# Patient Record
Sex: MALE | Race: WHITE | NOT HISPANIC OR LATINO
[De-identification: names, ages, dates, MRNs, and addresses within clinical notes are randomized per-mention and may not be internally consistent; named-entity substitution may affect disease eponyms.]

---

## 2017-05-30 ENCOUNTER — APPOINTMENT (OUTPATIENT)
Dept: CARDIOTHORACIC SURGERY | Facility: CLINIC | Age: 57
End: 2017-05-30

## 2017-06-05 ENCOUNTER — APPOINTMENT (OUTPATIENT)
Dept: CARDIOTHORACIC SURGERY | Facility: CLINIC | Age: 57
End: 2017-06-05

## 2017-06-07 ENCOUNTER — APPOINTMENT (OUTPATIENT)
Dept: CV DIAGNOSITCS | Facility: HOSPITAL | Age: 57
End: 2017-06-07

## 2017-06-22 ENCOUNTER — APPOINTMENT (OUTPATIENT)
Dept: CARDIOTHORACIC SURGERY | Facility: CLINIC | Age: 57
End: 2017-06-22

## 2017-07-10 ENCOUNTER — APPOINTMENT (OUTPATIENT)
Dept: CARDIOTHORACIC SURGERY | Facility: CLINIC | Age: 57
End: 2017-07-10

## 2017-07-17 ENCOUNTER — APPOINTMENT (OUTPATIENT)
Dept: CARDIOTHORACIC SURGERY | Facility: CLINIC | Age: 57
End: 2017-07-17

## 2017-07-26 ENCOUNTER — APPOINTMENT (OUTPATIENT)
Dept: CV DIAGNOSITCS | Facility: HOSPITAL | Age: 57
End: 2017-07-26

## 2017-09-11 ENCOUNTER — APPOINTMENT (OUTPATIENT)
Dept: CARDIOTHORACIC SURGERY | Facility: CLINIC | Age: 57
End: 2017-09-11
Payer: MEDICAID

## 2017-09-13 ENCOUNTER — APPOINTMENT (OUTPATIENT)
Dept: CV DIAGNOSITCS | Facility: HOSPITAL | Age: 57
End: 2017-09-13

## 2017-09-18 ENCOUNTER — APPOINTMENT (OUTPATIENT)
Dept: CARDIOTHORACIC SURGERY | Facility: CLINIC | Age: 57
End: 2017-09-18
Payer: MEDICAID

## 2017-09-28 ENCOUNTER — TRANSCRIPTION ENCOUNTER (OUTPATIENT)
Age: 57
End: 2017-09-28

## 2017-10-12 ENCOUNTER — APPOINTMENT (OUTPATIENT)
Dept: CV DIAGNOSITCS | Facility: HOSPITAL | Age: 57
End: 2017-10-12
Payer: MEDICAID

## 2017-10-12 ENCOUNTER — OUTPATIENT (OUTPATIENT)
Dept: OUTPATIENT SERVICES | Facility: HOSPITAL | Age: 57
LOS: 1 days | End: 2017-10-12

## 2017-10-12 DIAGNOSIS — I35.0 NONRHEUMATIC AORTIC (VALVE) STENOSIS: ICD-10-CM

## 2017-10-12 PROCEDURE — 93306 TTE W/DOPPLER COMPLETE: CPT | Mod: 26

## 2017-10-12 PROCEDURE — 93312 ECHO TRANSESOPHAGEAL: CPT | Mod: 26

## 2017-10-12 PROCEDURE — 76376 3D RENDER W/INTRP POSTPROCES: CPT | Mod: 26

## 2017-10-23 ENCOUNTER — APPOINTMENT (OUTPATIENT)
Dept: CARDIOTHORACIC SURGERY | Facility: CLINIC | Age: 57
End: 2017-10-23
Payer: MEDICAID

## 2017-10-29 ENCOUNTER — FORM ENCOUNTER (OUTPATIENT)
Age: 57
End: 2017-10-29

## 2017-10-30 ENCOUNTER — OUTPATIENT (OUTPATIENT)
Dept: OUTPATIENT SERVICES | Facility: HOSPITAL | Age: 57
LOS: 1 days | End: 2017-10-30
Payer: COMMERCIAL

## 2017-10-30 ENCOUNTER — APPOINTMENT (OUTPATIENT)
Dept: CARDIOTHORACIC SURGERY | Facility: CLINIC | Age: 57
End: 2017-10-30
Payer: MEDICAID

## 2017-10-30 VITALS
HEART RATE: 73 BPM | HEIGHT: 72 IN | TEMPERATURE: 98.1 F | WEIGHT: 293 LBS | OXYGEN SATURATION: 96 % | RESPIRATION RATE: 18 BRPM | SYSTOLIC BLOOD PRESSURE: 151 MMHG | BODY MASS INDEX: 39.68 KG/M2 | DIASTOLIC BLOOD PRESSURE: 81 MMHG

## 2017-10-30 LAB
ALBUMIN SERPL ELPH-MCNC: 4.9 G/DL — SIGNIFICANT CHANGE UP (ref 3.3–5)
ALP SERPL-CCNC: 58 U/L — SIGNIFICANT CHANGE UP (ref 40–120)
ALT FLD-CCNC: 36 U/L — SIGNIFICANT CHANGE UP (ref 10–45)
ANION GAP SERPL CALC-SCNC: 15 MMOL/L — SIGNIFICANT CHANGE UP (ref 5–17)
APPEARANCE UR: CLEAR — SIGNIFICANT CHANGE UP
APTT BLD: 31 SEC — SIGNIFICANT CHANGE UP (ref 27.5–37.4)
AST SERPL-CCNC: 32 U/L — SIGNIFICANT CHANGE UP (ref 10–40)
BASOPHILS NFR BLD AUTO: 0.4 % — SIGNIFICANT CHANGE UP (ref 0–2)
BILIRUB SERPL-MCNC: 0.4 MG/DL — SIGNIFICANT CHANGE UP (ref 0.2–1.2)
BILIRUB UR-MCNC: NEGATIVE — SIGNIFICANT CHANGE UP
BUN SERPL-MCNC: 13 MG/DL — SIGNIFICANT CHANGE UP (ref 7–23)
CALCIUM SERPL-MCNC: 9.6 MG/DL — SIGNIFICANT CHANGE UP (ref 8.4–10.5)
CHLORIDE SERPL-SCNC: 98 MMOL/L — SIGNIFICANT CHANGE UP (ref 96–108)
CHOLEST SERPL-MCNC: 169 MG/DL — SIGNIFICANT CHANGE UP (ref 10–199)
CO2 SERPL-SCNC: 25 MMOL/L — SIGNIFICANT CHANGE UP (ref 22–31)
COLOR SPEC: YELLOW — SIGNIFICANT CHANGE UP
CREAT SERPL-MCNC: 0.93 MG/DL — SIGNIFICANT CHANGE UP (ref 0.5–1.3)
DIFF PNL FLD: NEGATIVE — SIGNIFICANT CHANGE UP
EOSINOPHIL NFR BLD AUTO: 1.5 % — SIGNIFICANT CHANGE UP (ref 0–6)
GLUCOSE SERPL-MCNC: 96 MG/DL — SIGNIFICANT CHANGE UP (ref 70–99)
GLUCOSE UR QL: NEGATIVE — SIGNIFICANT CHANGE UP
HBA1C BLD-MCNC: 4.8 % — SIGNIFICANT CHANGE UP (ref 4–5.6)
HBV SURFACE AG SER-ACNC: SIGNIFICANT CHANGE UP
HCT VFR BLD CALC: 39.5 % — SIGNIFICANT CHANGE UP (ref 39–50)
HDLC SERPL-MCNC: 51 MG/DL — SIGNIFICANT CHANGE UP (ref 40–125)
HGB BLD-MCNC: 14 G/DL — SIGNIFICANT CHANGE UP (ref 13–17)
INR BLD: 1.1 — SIGNIFICANT CHANGE UP (ref 0.88–1.16)
KETONES UR-MCNC: NEGATIVE — SIGNIFICANT CHANGE UP
LEUKOCYTE ESTERASE UR-ACNC: NEGATIVE — SIGNIFICANT CHANGE UP
LIPID PNL WITH DIRECT LDL SERPL: 98 MG/DL — SIGNIFICANT CHANGE UP
LYMPHOCYTES # BLD AUTO: 30.9 % — SIGNIFICANT CHANGE UP (ref 13–44)
MCHC RBC-ENTMCNC: 28.8 PG — SIGNIFICANT CHANGE UP (ref 27–34)
MCHC RBC-ENTMCNC: 35.4 G/DL — SIGNIFICANT CHANGE UP (ref 32–36)
MCV RBC AUTO: 81.3 FL — SIGNIFICANT CHANGE UP (ref 80–100)
MONOCYTES NFR BLD AUTO: 5 % — SIGNIFICANT CHANGE UP (ref 2–14)
NEUTROPHILS NFR BLD AUTO: 62.2 % — SIGNIFICANT CHANGE UP (ref 43–77)
NITRITE UR-MCNC: NEGATIVE — SIGNIFICANT CHANGE UP
PH UR: 7 — SIGNIFICANT CHANGE UP (ref 5–8)
PLATELET # BLD AUTO: 307 K/UL — SIGNIFICANT CHANGE UP (ref 150–400)
POTASSIUM SERPL-MCNC: 4.2 MMOL/L — SIGNIFICANT CHANGE UP (ref 3.5–5.3)
POTASSIUM SERPL-SCNC: 4.2 MMOL/L — SIGNIFICANT CHANGE UP (ref 3.5–5.3)
PROT SERPL-MCNC: 7.7 G/DL — SIGNIFICANT CHANGE UP (ref 6–8.3)
PROT UR-MCNC: NEGATIVE MG/DL — SIGNIFICANT CHANGE UP
PROTHROM AB SERPL-ACNC: 12.2 SEC — SIGNIFICANT CHANGE UP (ref 9.8–12.7)
RBC # BLD: 4.86 M/UL — SIGNIFICANT CHANGE UP (ref 4.2–5.8)
RBC # FLD: 11.9 % — SIGNIFICANT CHANGE UP (ref 10.3–16.9)
SODIUM SERPL-SCNC: 138 MMOL/L — SIGNIFICANT CHANGE UP (ref 135–145)
SP GR SPEC: <=1.005 — SIGNIFICANT CHANGE UP (ref 1–1.03)
TOTAL CHOLESTEROL/HDL RATIO MEASUREMENT: 3.3 RATIO — LOW (ref 3.4–9.6)
TRIGL SERPL-MCNC: 100 MG/DL — SIGNIFICANT CHANGE UP (ref 10–149)
TSH SERPL-MCNC: 1.34 UIU/ML — SIGNIFICANT CHANGE UP (ref 0.35–4.94)
UROBILINOGEN FLD QL: 0.2 E.U./DL — SIGNIFICANT CHANGE UP
WBC # BLD: 7.4 K/UL — SIGNIFICANT CHANGE UP (ref 3.8–10.5)
WBC # FLD AUTO: 7.4 K/UL — SIGNIFICANT CHANGE UP (ref 3.8–10.5)

## 2017-10-30 PROCEDURE — 99205 OFFICE O/P NEW HI 60 MIN: CPT

## 2017-10-30 PROCEDURE — 71250 CT THORAX DX C-: CPT | Mod: 26

## 2017-10-30 PROCEDURE — 71250 CT THORAX DX C-: CPT

## 2017-10-30 PROCEDURE — 86850 RBC ANTIBODY SCREEN: CPT

## 2017-10-30 PROCEDURE — 86901 BLOOD TYPING SEROLOGIC RH(D): CPT

## 2017-10-30 PROCEDURE — 83036 HEMOGLOBIN GLYCOSYLATED A1C: CPT

## 2017-10-30 PROCEDURE — 85610 PROTHROMBIN TIME: CPT

## 2017-10-30 PROCEDURE — 80061 LIPID PANEL: CPT

## 2017-10-30 PROCEDURE — 80053 COMPREHEN METABOLIC PANEL: CPT

## 2017-10-30 PROCEDURE — 81003 URINALYSIS AUTO W/O SCOPE: CPT

## 2017-10-30 PROCEDURE — 84443 ASSAY THYROID STIM HORMONE: CPT

## 2017-10-30 PROCEDURE — 85025 COMPLETE CBC W/AUTO DIFF WBC: CPT

## 2017-10-30 PROCEDURE — 86900 BLOOD TYPING SEROLOGIC ABO: CPT

## 2017-10-30 PROCEDURE — 85730 THROMBOPLASTIN TIME PARTIAL: CPT

## 2017-10-30 PROCEDURE — 87340 HEPATITIS B SURFACE AG IA: CPT

## 2017-10-30 RX ORDER — BENZONATATE 100 MG/1
100 CAPSULE ORAL
Refills: 0 | Status: COMPLETED | COMMUNITY
End: 2017-10-30

## 2017-10-30 RX ORDER — LORATADINE 10 MG/1
10 TABLET ORAL DAILY
Refills: 0 | Status: COMPLETED | COMMUNITY
End: 2017-10-30

## 2017-10-30 RX ORDER — PROMETHAZINE HYDROCHLORIDE AND DEXTROMETHORPHAN HYDROBROMIDE ORAL SOLUTION 15; 6.25 MG/5ML; MG/5ML
6.25-15 SOLUTION ORAL
Refills: 0 | Status: COMPLETED | COMMUNITY
End: 2017-10-30

## 2017-11-03 VITALS
TEMPERATURE: 98 F | SYSTOLIC BLOOD PRESSURE: 138 MMHG | DIASTOLIC BLOOD PRESSURE: 77 MMHG | OXYGEN SATURATION: 97 % | HEART RATE: 83 BPM | RESPIRATION RATE: 16 BRPM

## 2017-11-03 NOTE — H&P ADULT - ASSESSMENT
57yo male with PMHx significant for severe Aortic Stenosis (with MAYRA 0.6cm2) with bicuspid valve and childhood asthma who presents for cardiac catheterization to assess coronaries prior to scheduled AVR 11/7/17.     - no Loading or pre cath fluids as patient pre op for AVR.    Risks & benefits of procedure and alternative therapy have been explained to the patient including but not limited to: allergic reaction, bleeding w/possible need for blood transfusion, infection, renal and vascular compromise, limb damage, arrhythmia, stroke, vessel dissection/perforation, Myocardial infarction, emergent CABG. Informed consent obtained and in chart.

## 2017-11-03 NOTE — H&P ADULT - FAMILY HISTORY
Father  Still living? No  Family history of heart attack, Age at diagnosis: 51-60  Family history of esophageal cancer, Age at diagnosis: Age Unknown

## 2017-11-03 NOTE — H&P ADULT - HISTORY OF PRESENT ILLNESS
57yo male with PMHx significant for severe Aortic Stenosis (with MAYRA 0.6cm20 with bicuspid valve and childhood asthma presented for routine checkup for evaluation of bicuspid valve with aortic stenosis. Patient reported that he was found to have murmur on physical exam in 2012 and had an echocardiogram which found to the following: mild to moderate aortic insufficiency, mild aortic mean gradient 15mmHg, MAYRA 1.5cm2, normal LV function, no pulm HTN, enlarged LA size, mild MR, LVEF 65%.  Patient has surveillance echocardiogram since then and had one 2/24/2017 that showed EF 60%, mild LA enlargement, bicuspid valve with moderate to severe AS, mitral valve normal, tricuspid valve normal, pulmonic valve normal, PAS 28mmHg, mean gradient 40mmHg, peak 75mmHg, MAYRA 1.13cm2. Patient have been asymptomatic; very active engaging in cycling classes 5 times a week. Denies chest pain, dizziness, palpitations, syncope, fatigue, shortness of breath, LE edema, PND, or orthopnea.  Patient subsequently had TEE10/22/2017 that revealed a calcified bicuspid aortic valve with decreased opening, right and non-coronary cusps fused and a calcified midline raphe is visualized, peak transaortic valve gradient ~89mmHg, mean transaortic valve gradient, ~MAYRA 0.6cm2 c/w severe aortic stenosis, mild to moderate AI, mitral annular calcification, otherwise normal MV, mild MR, normal aortic root, aortic arch, and descending thoracic aorta, normal LA with no LA appendage thrombus, normal LV systolic with EF 66%, no segmental WMA.  Patient was referred to Dr. Hoffman for surgical consultation who recommended that patient may benefit from and is a candidate for aortic valve replacement with bioprosthesis.   Patient now presents for cardiac catheterization to assess coronaries prior to scheduled AVR. 55yo male with PMHx significant for severe Aortic Stenosis (with MAYRA 0.6cm2) with bicuspid valve and childhood asthma presented for routine checkup for evaluation of bicuspid valve with aortic stenosis. Patient reported that he was found to have murmur on physical exam in 2012 and had an echocardiogram which found the following: mild to moderate aortic insufficiency, mild aortic stenosis, mean gradient 15mmHg, MAYRA 1.5cm2, normal LV function, no pulm HTN, enlarged LA size, mild MR, LVEF 65%.  Patient has surveillance echocardiogram since then and had one 2/24/2017 that showed EF 60%, mild LA enlargement, bicuspid valve with moderate to severe AS, mitral valve normal, tricuspid valve normal, pulmonic valve normal, PAS 28mmHg, mean gradient 40mmHg, peak 75mmHg, MAYRA 1.13cm2. Patient have been asymptomatic; very active engaging in cycling classes 5 times a week. Denies chest pain, dizziness, palpitations, syncope, fatigue, shortness of breath, LE edema, PND, or orthopnea.  Patient subsequently had TEE10/22/2017 that revealed a calcified bicuspid aortic valve with decreased opening, right and non-coronary cusps fused and a calcified midline raphe is visualized, peak transaortic valve gradient ~89mmHg, mean transaortic valve gradient, ~MAYRA 0.6cm2 c/w severe aortic stenosis, mild to moderate AI, mitral annular calcification, otherwise normal MV, mild MR, normal aortic root, aortic arch, and descending thoracic aorta, normal LA with no LA appendage thrombus, normal LV systolic with EF 66%, no segmental WMA.  Patient was referred to Dr. Hoffman for surgical consultation who recommended that patient may benefit from and is a candidate for aortic valve replacement with bioprosthesis.   Patient now presents for cardiac catheterization to assess coronaries prior to scheduled AVR. 57yo male with PMHx significant for severe Aortic Stenosis (with MAYRA 0.6cm2) with bicuspid valve and childhood asthma who presented for routine checkup for evaluation of bicuspid valve with aortic stenosis.  Patient reported that he was found to have murmur on physical exam in 2012 and since has undergone Surveillance Echocardiograms.  Most recent Echocardiogram 2/24/2017 revealing EF 60%, mild LA enlargement, bicuspid valve with moderate to severe AS, mitral valve normal, tricuspid valve normal, pulmonic valve normal, PAS 28mmHg, mean gradient 40mmHg, peak 75mmHg, MAYRA 1.13cm2. Patient has been asymptomatic; very active engaging in cycling classes 5 times a week. Denies chest pain, dizziness, palpitations, syncope, fatigue, shortness of breath, LE edema, PND, or orthopnea.  Patient subsequently had TEE10/22/2017 that revealed a calcified bicuspid aortic valve with decreased opening, right and non-coronary cusps fused and a calcified midline raphe is visualized, peak transaortic valve gradient ~89mmHg, mean transaortic valve gradient, ~MAYRA 0.6cm2 c/w severe aortic stenosis, mild to moderate AI, mitral annular calcification, otherwise normal MV, mild MR, normal aortic root, aortic arch, and descending thoracic aorta, normal LA with no LA appendage thrombus, normal LV systolic with EF 66%, no segmental WMA.    Patient was referred to Dr. Hoffman for surgical consultation who recommended that patient would benefit from and is a candidate for aortic valve replacement with bioprosthesis.  He now presents for cardiac catheterization to assess coronaries prior to scheduled AVR 11/7/17.

## 2017-11-06 ENCOUNTER — INPATIENT (INPATIENT)
Facility: HOSPITAL | Age: 57
LOS: 4 days | Discharge: HOME HEALTH | DRG: 217 | End: 2017-11-11
Attending: THORACIC SURGERY (CARDIOTHORACIC VASCULAR SURGERY) | Admitting: THORACIC SURGERY (CARDIOTHORACIC VASCULAR SURGERY)
Payer: COMMERCIAL

## 2017-11-06 DIAGNOSIS — Z87.442 PERSONAL HISTORY OF URINARY CALCULI: Chronic | ICD-10-CM

## 2017-11-06 LAB
ALBUMIN SERPL ELPH-MCNC: 4.5 G/DL — SIGNIFICANT CHANGE UP (ref 3.3–5)
ALP SERPL-CCNC: 48 U/L — SIGNIFICANT CHANGE UP (ref 40–120)
ALT FLD-CCNC: 38 U/L — SIGNIFICANT CHANGE UP (ref 10–45)
ANION GAP SERPL CALC-SCNC: 13 MMOL/L — SIGNIFICANT CHANGE UP (ref 5–17)
APPEARANCE UR: CLEAR — SIGNIFICANT CHANGE UP
APTT BLD: 23.6 SEC — LOW (ref 27.5–37.4)
APTT BLD: >200 SEC — CRITICAL HIGH (ref 27.5–37.4)
AST SERPL-CCNC: 38 U/L — SIGNIFICANT CHANGE UP (ref 10–40)
BASOPHILS NFR BLD AUTO: 0.3 % — SIGNIFICANT CHANGE UP (ref 0–2)
BASOPHILS NFR BLD AUTO: 0.5 % — SIGNIFICANT CHANGE UP (ref 0–2)
BILIRUB SERPL-MCNC: 0.4 MG/DL — SIGNIFICANT CHANGE UP (ref 0.2–1.2)
BILIRUB UR-MCNC: NEGATIVE — SIGNIFICANT CHANGE UP
BLD GP AB SCN SERPL QL: NEGATIVE — SIGNIFICANT CHANGE UP
BUN SERPL-MCNC: 16 MG/DL — SIGNIFICANT CHANGE UP (ref 7–23)
CALCIUM SERPL-MCNC: 9.3 MG/DL — SIGNIFICANT CHANGE UP (ref 8.4–10.5)
CHLORIDE SERPL-SCNC: 104 MMOL/L — SIGNIFICANT CHANGE UP (ref 96–108)
CK MB CFR SERPL CALC: 2.9 NG/ML — SIGNIFICANT CHANGE UP (ref 0–6.7)
CO2 SERPL-SCNC: 23 MMOL/L — SIGNIFICANT CHANGE UP (ref 22–31)
COLOR SPEC: YELLOW — SIGNIFICANT CHANGE UP
CREAT SERPL-MCNC: 0.83 MG/DL — SIGNIFICANT CHANGE UP (ref 0.5–1.3)
DIFF PNL FLD: NEGATIVE — SIGNIFICANT CHANGE UP
EOSINOPHIL NFR BLD AUTO: 1.2 % — SIGNIFICANT CHANGE UP (ref 0–6)
EOSINOPHIL NFR BLD AUTO: 1.7 % — SIGNIFICANT CHANGE UP (ref 0–6)
GLUCOSE SERPL-MCNC: 105 MG/DL — HIGH (ref 70–99)
GLUCOSE UR QL: NEGATIVE — SIGNIFICANT CHANGE UP
HCT VFR BLD CALC: 36.3 % — LOW (ref 39–50)
HCT VFR BLD CALC: 38.3 % — LOW (ref 39–50)
HGB BLD-MCNC: 13.4 G/DL — SIGNIFICANT CHANGE UP (ref 13–17)
HGB BLD-MCNC: 13.7 G/DL — SIGNIFICANT CHANGE UP (ref 13–17)
INR BLD: 1.13 — SIGNIFICANT CHANGE UP (ref 0.88–1.16)
INR BLD: 1.21 — HIGH (ref 0.88–1.16)
KETONES UR-MCNC: NEGATIVE — SIGNIFICANT CHANGE UP
LEUKOCYTE ESTERASE UR-ACNC: NEGATIVE — SIGNIFICANT CHANGE UP
LYMPHOCYTES # BLD AUTO: 35.7 % — SIGNIFICANT CHANGE UP (ref 13–44)
LYMPHOCYTES # BLD AUTO: 38.5 % — SIGNIFICANT CHANGE UP (ref 13–44)
MCHC RBC-ENTMCNC: 28.8 PG — SIGNIFICANT CHANGE UP (ref 27–34)
MCHC RBC-ENTMCNC: 29.6 PG — SIGNIFICANT CHANGE UP (ref 27–34)
MCHC RBC-ENTMCNC: 35.8 G/DL — SIGNIFICANT CHANGE UP (ref 32–36)
MCHC RBC-ENTMCNC: 36.9 G/DL — HIGH (ref 32–36)
MCV RBC AUTO: 80.1 FL — SIGNIFICANT CHANGE UP (ref 80–100)
MCV RBC AUTO: 80.6 FL — SIGNIFICANT CHANGE UP (ref 80–100)
MONOCYTES NFR BLD AUTO: 4 % — SIGNIFICANT CHANGE UP (ref 2–14)
MONOCYTES NFR BLD AUTO: 7.3 % — SIGNIFICANT CHANGE UP (ref 2–14)
NEUTROPHILS NFR BLD AUTO: 55.3 % — SIGNIFICANT CHANGE UP (ref 43–77)
NEUTROPHILS NFR BLD AUTO: 55.5 % — SIGNIFICANT CHANGE UP (ref 43–77)
NITRITE UR-MCNC: NEGATIVE — SIGNIFICANT CHANGE UP
NT-PROBNP SERPL-SCNC: 133 PG/ML — SIGNIFICANT CHANGE UP (ref 0–300)
PH UR: 7 — SIGNIFICANT CHANGE UP (ref 5–8)
PLATELET # BLD AUTO: 250 K/UL — SIGNIFICANT CHANGE UP (ref 150–400)
PLATELET # BLD AUTO: 257 K/UL — SIGNIFICANT CHANGE UP (ref 150–400)
POTASSIUM SERPL-MCNC: 4.6 MMOL/L — SIGNIFICANT CHANGE UP (ref 3.5–5.3)
POTASSIUM SERPL-SCNC: 4.6 MMOL/L — SIGNIFICANT CHANGE UP (ref 3.5–5.3)
PROT SERPL-MCNC: 7.3 G/DL — SIGNIFICANT CHANGE UP (ref 6–8.3)
PROT UR-MCNC: NEGATIVE MG/DL — SIGNIFICANT CHANGE UP
PROTHROM AB SERPL-ACNC: 12.6 SEC — SIGNIFICANT CHANGE UP (ref 9.8–12.7)
PROTHROM AB SERPL-ACNC: 13.5 SEC — HIGH (ref 9.8–12.7)
RBC # BLD: 4.53 M/UL — SIGNIFICANT CHANGE UP (ref 4.2–5.8)
RBC # BLD: 4.75 M/UL — SIGNIFICANT CHANGE UP (ref 4.2–5.8)
RBC # FLD: 11.8 % — SIGNIFICANT CHANGE UP (ref 10.3–16.9)
RBC # FLD: 11.8 % — SIGNIFICANT CHANGE UP (ref 10.3–16.9)
RH IG SCN BLD-IMP: POSITIVE — SIGNIFICANT CHANGE UP
SODIUM SERPL-SCNC: 140 MMOL/L — SIGNIFICANT CHANGE UP (ref 135–145)
SP GR SPEC: <=1.005 — SIGNIFICANT CHANGE UP (ref 1–1.03)
TSH SERPL-MCNC: 1.06 UIU/ML — SIGNIFICANT CHANGE UP (ref 0.35–4.94)
UROBILINOGEN FLD QL: 0.2 E.U./DL — SIGNIFICANT CHANGE UP
WBC # BLD: 5.8 K/UL — SIGNIFICANT CHANGE UP (ref 3.8–10.5)
WBC # BLD: 7.5 K/UL — SIGNIFICANT CHANGE UP (ref 3.8–10.5)
WBC # FLD AUTO: 5.8 K/UL — SIGNIFICANT CHANGE UP (ref 3.8–10.5)
WBC # FLD AUTO: 7.5 K/UL — SIGNIFICANT CHANGE UP (ref 3.8–10.5)

## 2017-11-06 PROCEDURE — 76536 US EXAM OF HEAD AND NECK: CPT | Mod: 26

## 2017-11-06 PROCEDURE — 93010 ELECTROCARDIOGRAM REPORT: CPT

## 2017-11-06 PROCEDURE — 71010: CPT | Mod: 26

## 2017-11-06 PROCEDURE — 93880 EXTRACRANIAL BILAT STUDY: CPT | Mod: 26,59

## 2017-11-06 PROCEDURE — 93454 CORONARY ARTERY ANGIO S&I: CPT | Mod: 26

## 2017-11-06 RX ORDER — INFLUENZA VIRUS VACCINE 15; 15; 15; 15 UG/.5ML; UG/.5ML; UG/.5ML; UG/.5ML
0.5 SUSPENSION INTRAMUSCULAR ONCE
Qty: 0 | Refills: 0 | Status: DISCONTINUED | OUTPATIENT
Start: 2017-11-06 | End: 2017-11-09

## 2017-11-06 RX ORDER — CHLORHEXIDINE GLUCONATE 213 G/1000ML
1 SOLUTION TOPICAL ONCE
Qty: 0 | Refills: 0 | Status: DISCONTINUED | OUTPATIENT
Start: 2017-11-06 | End: 2017-11-06

## 2017-11-06 RX ORDER — ASPIRIN/CALCIUM CARB/MAGNESIUM 324 MG
81 TABLET ORAL DAILY
Qty: 0 | Refills: 0 | Status: DISCONTINUED | OUTPATIENT
Start: 2017-11-06 | End: 2017-11-08

## 2017-11-06 RX ORDER — FAMOTIDINE 10 MG/ML
20 INJECTION INTRAVENOUS
Qty: 0 | Refills: 0 | Status: DISCONTINUED | OUTPATIENT
Start: 2017-11-06 | End: 2017-11-08

## 2017-11-06 RX ORDER — SODIUM CHLORIDE 9 MG/ML
3 INJECTION INTRAMUSCULAR; INTRAVENOUS; SUBCUTANEOUS ONCE
Qty: 0 | Refills: 0 | Status: COMPLETED | OUTPATIENT
Start: 2017-11-06 | End: 2017-11-07

## 2017-11-06 RX ORDER — HEPARIN SODIUM 5000 [USP'U]/ML
5000 INJECTION INTRAVENOUS; SUBCUTANEOUS EVERY 8 HOURS
Qty: 0 | Refills: 0 | Status: DISCONTINUED | OUTPATIENT
Start: 2017-11-07 | End: 2017-11-08

## 2017-11-06 NOTE — PATIENT PROFILE ADULT. - NS PRO INDICAT FLU
Patient/surrogate requesting vaccine Patient is 18 years or older/Patient/surrogate requesting vaccine

## 2017-11-06 NOTE — PROGRESS NOTE ADULT - ASSESSMENT
57yo male with PMHx significant for severe Aortic Stenosis (with MAYRA 0.6cm2) with bicuspid valve and childhood asthma who presents for cardiac catheterization to assess coronaries prior to scheduled AVR 11/7/17. Cardiac cath revealed non- obstructive CAD. (Mild LM, MIDLAD 30% stenosis, Diagnosis 100% with collaterals),  RAMus 75% stenosis, Prox Circ 30%, MID RCA 30% EF 60%. Patient will have AVR on Wednesday.   - Pre-op Labs ordered.   - Follow up PFTs, carotid US, CXR   - Needs thyroid US for nodule seen on CT ches   - Dr. Trotter to see for Pulmonary nodules.   - Aspirin 81mg PO  - Ask about atorvistatin tomorrow as patient reports myalgias.   - BP soft in cath lab holding. Recheck tomorrow need for BB.   - Follow up morning labs and chest Xray.     CHARLES Silverio

## 2017-11-06 NOTE — PATIENT PROFILE ADULT. - VISION (WITH CORRECTIVE LENSES IF THE PATIENT USUALLY WEARS THEM):
"Discharge Instructions: After Your Surgery/Procedure  Youve just had surgery. During surgery you were given medicine called anesthesia to keep you relaxed and free of pain. After surgery you may have some pain or nausea. This is common. Here are some tips for feeling better and getting well after surgery.     Stay on schedule with your medication.   Going home  Your doctor or nurse will show you how to take care of yourself when you go home. He or she will also answer your questions. Have an adult family member or friend drive you home.      For your safety we recommend these precaution for the first 24 hours after your procedure:  · Do not drive or use heavy equipment.  · Do not make important decisions or sign legal papers.  · Do not drink alcohol.  · Have someone stay with you, if needed. He or she can watch for problems and help keep you safe.  · Your concentration, balance, coordination, and judgement may be impaired for many hours after anesthesia.  Use caution when ambulating or standing up.     · You may feel weak and "washed out" after anesthesia and surgery.      Subtle residual effects of general anesthesia or sedation with regional / local anesthesia can last more than 24 hours.  Rest for the remainder of the day or longer if your Doctor/Surgeon has advised you to do so.  Although you may feel normal within the first 24 hours, your reflexes and mental ability may be impaired without you realizing it.  You may feel dizzy, lightheaded or sleepy for 24 hours or longer.      Be sure to go to all follow-up visits with your doctor. And rest after your surgery for as long as your doctor tells you to.  Coping with pain  If you have pain after surgery, pain medicine will help you feel better. Take it as told, before pain becomes severe. Also, ask your doctor or pharmacist about other ways to control pain. This might be with heat, ice, or relaxation. And follow any other instructions your surgeon or nurse gives " Normal vision: sees adequately in most situations; can see medication labels, newsprint you.  Tips for taking pain medicine  To get the best relief possible, remember these points:  · Pain medicines can upset your stomach. Taking them with a little food may help.  · Most pain relievers taken by mouth need at least 20 to 30 minutes to start to work.  · Taking medicine on a schedule can help you remember to take it. Try to time your medicine so that you can take it before starting an activity. This might be before you get dressed, go for a walk, or sit down for dinner.  · Constipation is a common side effect of pain medicines. Call your doctor before taking any medicines such as laxatives or stool softeners to help ease constipation. Also ask if you should skip any foods. Drinking lots of fluids and eating foods such as fruits and vegetables that are high in fiber can also help. Remember, do not take laxatives unless your surgeon has prescribed them.  · Drinking alcohol and taking pain medicine can cause dizziness and slow your breathing. It can even be deadly. Do not drink alcohol while taking pain medicine.  · Pain medicine can make you react more slowly to things. Do not drive or run machinery while taking pain medicine.  Your health care provider may tell you to take acetaminophen to help ease your pain. Ask him or her how much you are supposed to take each day. Acetaminophen or other pain relievers may interact with your prescription medicines or other over-the-counter (OTC) drugs. Some prescription medicines have acetaminophen and other ingredients. Using both prescription and OTC acetaminophen for pain can cause you to overdose. Read the labels on your OTC medicines with care. This will help you to clearly know the list of ingredients, how much to take, and any warnings. It may also help you not take too much acetaminophen. If you have questions or do not understand the information, ask your pharmacist or health care provider to explain it to you before you take the OTC medicine.  Managing  nausea  Some people have an upset stomach after surgery. This is often because of anesthesia, pain, or pain medicine, or the stress of surgery. These tips will help you handle nausea and eat healthy foods as you get better. If you were on a special food plan before surgery, ask your doctor if you should follow it while you get better. These tips may help:  · Do not push yourself to eat. Your body will tell you when to eat and how much.  · Start off with clear liquids and soup. They are easier to digest.  · Next try semi-solid foods, such as mashed potatoes, applesauce, and gelatin, as you feel ready.  · Slowly move to solid foods. Dont eat fatty, rich, or spicy foods at first.  · Do not force yourself to have 3 large meals a day. Instead eat smaller amounts more often.  · Take pain medicines with a small amount of solid food, such as crackers or toast, to avoid nausea.     Call your surgeon if  · You still have pain an hour after taking medicine. The medicine may not be strong enough.  · You feel too sleepy, dizzy, or groggy. The medicine may be too strong.  · You have side effects like nausea, vomiting, or skin changes, such as rash, itching, or hives.       If you have obstructive sleep apnea  You were given anesthesia medicine during surgery to keep you comfortable and free of pain. After surgery, you may have more apnea spells because of this medicine and other medicines you were given. The spells may last longer than usual.   At home:  · Keep using the continuous positive airway pressure (CPAP) device when you sleep. Unless your health care provider tells you not to, use it when you sleep, day or night. CPAP is a common device used to treat obstructive sleep apnea.  · Talk with your provider before taking any pain medicine, muscle relaxants, or sedatives. Your provider will tell you about the possible dangers of taking these medicines.  © 8349-2690 [employer]. [address] [place] [address]  [place] [address]. All rights reserved. This information is not intended as a substitute for professional medical care. Always follow your healthcare professional's instructions.    General Information:    1.  Do not drink alcoholic beverages including beer for 24 hours or as long as you are on pain medication..  2.  Do not drive a motor vehicle, operate machinery or power tools, or signs legal papers for 24 hours or as long as you are on pain medication.   3.  You may experience light-headedness, dizziness, and sleepiness following surgery. Please do not stay alone. A responsible adult should be with you for this 24 hour period.  4.  Go home and rest.    5. Progress slowly to a normal diet unless instructed.  Otherwise, begin with liquids such as soft drinks, then soup and crackers working up to solid foods. Drink plenty of nonalcoholic fluids.  6.  Certain anesthetics and pain medications produce nausea and vomiting in certain       individuals. If nausea becomes a problem at home, call you doctor.    7. A nurse will be calling you sometime after surgery. Do not be alarmed. This is our way of finding out how you are doing.    8. Several times every hour while you are awake, take 2-3 deep breaths and cough. If you had stomach surgery hold a pillow or rolled towel firmly against your stomach before you cough. This will help with any pain the cough might cause.  9. Several times every hour while you are awake, pump and flex your feet 5-6 times and do foot circles. This will help prevent blood clots.    10.Call your doctor for severe pain, bleeding, fever, or signs or symptoms of infection (pain, swelling, redness, foul odor, drainage).    Using Opioids for Pain Management     Your doctor has given instructions for you to take an opioid.  This is a drug for bad pain.  It helps control pain without causing bleeding and kidney problems.  Common opioid names are morphine, hydromorphone, oxycodone, and methadone. These drugs are  called narcotics.    There are several safety concerns you need to know.     · It is against the law to give or sell this drug to another person.  You must keep this medicine safely locked.    · You may have side effects from taking this medication.  These include nausea, itching, sweating, sleepiness, a change in your ability to breathe, and depression.  · Do not take alcohol or sleeping pills opioids.    · Long-term opoid use may no longer giver you relief from pain.  It can cause you stomach pain, mental anxiety, and headaches.  Long-term opoid use can potentially lead to unlawful street drug abuse and reduce your ability to stay employed.    · Your body may become opioid tolerant if you need to take more to get relief.    · You must stop taking opioids if you begin having more pain as a result of the medicine.    · Opioid withdrawal occurs when you have to stop taking the drug.  It can cause you to have nausea, vomiting, diarrhea, stomach pain, anxiety, and dilated pupils in your eyes. This condition means you are opioid dependent.    · Addiction is a drug induced brain disease. It means there are changes in how your brain is working.  Children, teens, and young adults under 25 years old are more likely to get addicted to opioids.      · Addiction can happen with repeated opioid use.  It does not happen with short-term use of two weeks or less.       For more information, please speak with your doctor or pharmacist.      We hope your stay was comfortable as you heal now, mend and rest.    For we have enjoyed taking care of you by giving your our best.    And as you get better, by regaining your health and strength;   We count it as a privilege to have served you and hope your time at [facility] was well spent.    Thank  You!!!      Shock Wave Lithotripsy  Passing a kidney stone can be very painful. Shock wave lithotripsy is a treatment that helps by breaking the kidney stone into smaller pieces that are easier to  pass. This treatment is also called extracorporeal shock wave lithotripsy (ESWL). Lithotripsy takes about an hour. Its done in a hospital, lithotripsy center, or mobile lithotripsy van. You will likely go home the same day. This treatment is not used for all types of kidney stones. Your healthcare provider will discuss whether this is the right treatment for the type of stone you have.      Energy waves strike the stone, which begins to crack. The stone crumbles into tiny pieces.   During the procedure  · You get medicine to prevent pain and help you relax or sleep during lithotripsy. Once this takes effect, the procedure will start.  · A stent (flexible tube with holes in it) may be placed into your ureter (the tube that connects the kidney and the bladder). This helps keep urine flowing from the kidney.  · Your healthcare provider then uses X-ray or ultrasound to find the exact location of the kidney stone.  · Sound waves are aimed at the stone and sent at high speed. If youre awake, you may feel a tapping as they pass through your body.  After the procedure  · Youll be monitored in a recovery room for about 1 hour to 3 hours. Antibiotics and pain medicine may be prescribed before you leave.  · Youll have a follow-up visit in a few weeks. If you received a stent, it will be removed. Your doctor will also check for pieces of stone. If large pieces remain, you may need a second lithotripsy or another procedure.  Possible risks and complications  · Infection  · Bleeding in the kidney  · Bruising of the kidney or skin  · Obstruction (blockage) of the ureter  · Failure to break up the stone (other procedures may be needed)   Passing the stone  It can take a day to several weeks for the pieces of stone to leave your body. Drink plenty of liquids to help flush your system. During this time:  · Your urine may be cloudy or slightly bloody. You may even see small pieces of stone.  · You may have a slight fever and some  pain. Take prescribed or over-the-counter pain medication as instructed by your healthcare provider.  · You may be asked to strain your urine to collect some stone particles. These will be studied in the lab.  When to call your healthcare provider   Contact your healthcare provider right away if you have any of the following:  · Fever of 100.4°F (38°C) or higher, or as directed by your healthcare provider  · Heavy bleeding  · Pain that doesnt go away with medication  · Upset stomach and vomiting  · Problems urinating   Date Last Reviewed: 11/26/2014  © 2192-1766 [employer]. [address] [place] [address]. All rights reserved. This information is not intended as a substitute for professional medical care. Always follow your healthcare professional's instructions.

## 2017-11-06 NOTE — PROGRESS NOTE ADULT - SUBJECTIVE AND OBJECTIVE BOX
Patient discussed on morning rounds with Dr. Barahona     Operation / Date: Pre-op AVR     SUBJECTIVE ASSESSMENT:  56y Male reports feeling well after cardiac cath. States that he is alittle anxious about the surgery but offers no complaints at this time.         Vital Signs Last 24 Hrs  T(C): --  T(F): --  HR: --  BP: --  BP(mean): --  RR: --  SpO2: --  I&O's Detail      CHEST TUBE:  No.   TRACEE DRAIN:  No.  EPICARDIAL WIRES: No.  TIE DOWNS: No.  HORTON: No.    PHYSICAL EXAM:    General: Aox3 in no acute distress.     Neurological: No focal neuro deficit. No speech abnormality.     Cardiovascular: RRR + IGOR heard best at the left 2nd intercostal space. No heeves or lifts.     Respiratory : CTA bilaterally no wheeze rhonchi or rales     Gastrointestinal: soft non tender non distended normal bowel sounds.     Extremities: WWP no lower extremity edema. bilaterally     Vascular: 2+ left radial pulses. Radial band on right radial 2+ DP pulse bilaterally. No varocosities.     Incision Sites: n/a     LABS:                        13.7   7.5   )-----------( 250      ( 06 Nov 2017 14:53 )             38.3       PT/INR - ( 06 Nov 2017 14:53 )   PT: 13.5 sec;   INR: 1.21          PTT - ( 06 Nov 2017 14:53 )  PTT:>200.0 sec    11-06    140  |  104  |  16  ----------------------------<  105<H>  4.6   |  23  |  0.83    Ca    9.3      06 Nov 2017 10:44    TPro  7.3  /  Alb  4.5  /  TBili  0.4  /  DBili  x   /  AST  38  /  ALT  38  /  AlkPhos  48  11-06      Urinalysis Basic - ( 06 Nov 2017 14:53 )    Color: Yellow / Appearance: Clear / SG: <=1.005 / pH: x  Gluc: x / Ketone: NEGATIVE  / Bili: Negative / Urobili: 0.2 E.U./dL   Blood: x / Protein: NEGATIVE mg/dL / Nitrite: NEGATIVE   Leuk Esterase: NEGATIVE / RBC: x / WBC x   Sq Epi: x / Non Sq Epi: x / Bacteria: x        MEDICATIONS  (STANDING):  sodium chloride 0.9% lock flush 3 milliLiter(s) IV Push Once    MEDICATIONS  (PRN):        RADIOLOGY & ADDITIONAL TESTS:    < from: CT Chest No Cont (10.30.17 @ 15:19) >  INDICATION: Aortic stenosis. Preoperative examination prior to valve   replacement.    TECHNIQUE: CT of the chest was performed without intravenous contrast.    Intravenous contrast was not used , as requested. Axial, sagittal and   coronal images were produced and reviewed.    PRIOR STUDIES: None.    FINDINGS:  Evaluation of the vasculature is limited without IV contrast   at the due to lack of cardiac gating.    AORTA: The diameter of the aorta was measured at the following levels:    Aortic root (sinuses of Valsalva): 3.6 cm transversely.    Ascending aorta: 3.4 x 3.5 cm    Aortic arch: 2.7 x 2.8 cm    Proximal descending thoracic aorta: 3.0 x 2.8 cm    Distal descending thoracic aorta: 2.5 x 2.4 cm    Suprarenal abdominal aorta: 2.2 x 2.3 cm    Severe aortic valve calcifications consistent with the reported history   of aortic valve stenosis. Otherwise, no significant atherosclerotic   calcifications are seen throughout the thoracic aorta. Bovine aortic arch.    OTHER FINDINGS:   The heart is normal in size. Coronary artery calcifications.  No   pericardial effusion is seen. There is aortic valve and coronary artery   calcifications.    Evaluation for adenopathy is limited without intravenous contrast. Within   that limitation, no mediastinal, hilar or axillary lymphadenopathy is   seen. 9 mm low-density right thyroid nodule.    The lungs demonstrate bilateral apical scarring. There are a few   scattered small calcified granulomas bilaterally and a 2 mm right   perifissural nodule. There is a somewhat C shaped 1.6 x 1.6 x 0.9 cm left   lower lobe pleural-based nodular density.    Limited evaluation of the upper abdomen demonstrates no abnormality.    Evaluation of the osseous structures demonstrates mild degenerative   changes. A couple of subcentimeter sclerotic lesions likely representing   bone islands.    IMPRESSION:  Severe aortic valve calcification which is consistent with the reported   history of aortic valve stenosis.    9 mm right thyroid nodule.    Punctate calcified pulmonary nodules compatible with old granulomatous   disease.    1.6 x 1.6 x 0.9 cm left lower lobe pleural-based nodular density which   may represent a scarring, atelectasis or a pulmonary nodule. Comparison   with prior studies is recommended. In the absence of prior studies, a   PET/CT scan is suggested.      < end of copied text >

## 2017-11-06 NOTE — CONSULT NOTE ADULT - SUBJECTIVE AND OBJECTIVE BOX
CHIEF COMPLAINT: Aortic Stenosis    HISTORY OF PRESENT ILLNESS:  57yo male with PMHx significant for severe Aortic Stenosis (with MAYRA 0.6cm2) with bicuspid valve and childhood asthma who presented for routine checkup for evaluation of bicuspid valve with aortic stenosis.  Patient reported that he was found to have murmur on physical exam in 2012 and since has undergone Surveillance Echocardiograms.  Most recent Echocardiogram 2/24/2017 revealing EF 60%, mild LA enlargement, bicuspid valve with moderate to severe AS, mitral valve normal, tricuspid valve normal, pulmonic valve normal, PAS 28mmHg, mean gradient 40mmHg, peak 75mmHg, MAYRA 1.13cm2. Patient has been asymptomatic; very active engaging in cycling classes 5 times a week. Denies chest pain, dizziness, palpitations, syncope, fatigue, shortness of breath, LE edema, PND, or orthopnea.  Patient subsequently had TEE10/22/2017 that revealed a calcified bicuspid aortic valve with decreased opening, right and non-coronary cusps fused and a calcified midline raphe is visualized, peak transaortic valve gradient ~89mmHg, mean transaortic valve gradient, ~MAYRA 0.6cm2 c/w severe aortic stenosis, mild to moderate AI, mitral annular calcification, otherwise normal MV, mild MR, normal aortic root, aortic arch, and descending thoracic aorta, normal LA with no LA appendage thrombus, normal LV systolic with EF 66%, no segmental WMA. Patient was referred to Dr. Hoffman for surgical consultation who recommended that patient would benefit from and is a candidate for aortic valve replacement with bioprosthesis.  He now presents for cardiac catheterization to assess coronaries prior to scheduled AVR 11/7/17. Cardiac cath revealed non-obstructive coronaries to be managed medically. Patient was seen at the bedside to discuss Prevention.     PAST MEDICAL & SURGICAL HISTORY:  Aortic stenosis due to bicuspid aortic valve  Aortic stenosis, severe  History of kidney stones    FAMILY HISTORY:     Allergies:   No Known Allergies      HOME MEDICATIONS:  · 	aspirin 81 mg oral tablet: 1 tab(s) orally once a day, Last Dose Taken:        	  LABS:                        13.7   7.5   )-----------( 250      ( 06 Nov 2017 14:53 )             38.3     11-06    140  |  104  |  16  ----------------------------<  105<H>  4.6   |  23  |  0.83    Ca    9.3      06 Nov 2017 10:44    TPro  7.3  /  Alb  4.5  /  TBili  0.4  /  DBili  x   /  AST  38  /  ALT  38  /  AlkPhos  48  11-06    Serum Pro-Brain Natriuretic Peptide: 133 pg/mL (11-06 @ 14:52)    Thyroid Stimulating Hormone, Serum: 1.064 uIU/mL (11-06 @ 14:52)    Lipid Profile (10.30.17 @ 14:42)    Total Cholesterol/HDL Ratio Measurement: 3.3 RATIO    Cholesterol, Serum: 169 mg/dL    Triglycerides, Serum: 100 mg/dL    HDL Cholesterol, Serum: 51 mg/dL    Direct LDL: 98 mg/dl    Hemoglobin A1C, Whole Blood (10.30.17 @ 14:45)    Hemoglobin A1C, Whole Blood: 4.8    10 "S" ASSESSMENT PLAN: SMOKING, SITTING, SUGAR, SALT, SOME FATS, SOCIAL, SLEEP, SIGNS, AND MEDS:  Tobacco usage: Denies.   Stress: "Not bad" per patient. 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   ETOH: He has 1 drink twice monthly.   Caffeine: He drinks "a lot" of coffee. He has two 20 oz cups of coffee per day with non-dairy creamer and Splenda.   Hormone Replacement: Denies.   Sleep Disorder: + snoring per pt's wife. States snoring has worsened with a 20 lbs weight gain over the past year (pt attributes weight gain to evening snacking.)   Inflammatory Condition: Denies.   Activity Level: He is very active taking about 5 spin classes per week.   Current Diet: Breakfast) bagel with cream cheese or egg and sausage on a croissant. Lunch) Chinese food, pastrami sandwich, or fried chicken. Dinner) fried chicken, Chinese food, or if he and his wife cook at home he will eat shrimp or fish with quinoa or brown rice and a vegetable. Snacks) popcorn dipped in chocolate and ice cream.   Heart Failure: Denies symptoms of volume overload or history of CHF.   Myopathy with Statins: He was previously placed on Lipitor but it was stopped due to him having increased soreness after working out.   GI/ Issues: Denies.     ASSESSMENT/RECOMMENDATIONS: 	  Summary: 57yo male with PMHx significant for severe Aortic Stenosis (with MAYRA 0.6cm2) with bicuspid valve and childhood asthma who presented for routine checkup for evaluation of bicuspid valve with aortic stenosis.  Patient reported that he was found to have murmur on physical exam in 2012 and since has undergone Surveillance Echocardiograms.  Most recent Echocardiogram 2/24/2017 revealing EF 60%, mild LA enlargement, bicuspid valve with moderate to severe AS, mitral valve normal, tricuspid valve normal, pulmonic valve normal, PAS 28mmHg, mean gradient 40mmHg, peak 75mmHg, MAYRA 1.13cm2. Patient has been asymptomatic; very active engaging in cycling classes 5 times a week. Denies chest pain, dizziness, palpitations, syncope, fatigue, shortness of breath, LE edema, PND, or orthopnea.  Patient subsequently had TEE10/22/2017 that revealed a calcified bicuspid aortic valve with decreased opening, right and non-coronary cusps fused and a calcified midline raphe is visualized, peak transaortic valve gradient ~89mmHg, mean transaortic valve gradient, ~MAYRA 0.6cm2 c/w severe aortic stenosis, mild to moderate AI, mitral annular calcification, otherwise normal MV, mild MR, normal aortic root, aortic arch, and descending thoracic aorta, normal LA with no LA appendage thrombus, normal LV systolic with EF 66%, no segmental WMA. Patient was referred to Dr. Hoffman for surgical consultation who recommended that patient would benefit from and is a candidate for aortic valve replacement with bioprosthesis.  He now presents for cardiac catheterization to assess coronaries prior to scheduled AVR 11/7/17. Cardiac cath revealed non-obstructive coronaries to be managed medically. Patient was seen at the bedside to discuss Prevention.     RECOMMENDATIONS:   Anti-platelet Therapy: DAPT per interventionalist recommendation.   Lipid Therapy: High-dose statin therapy would likely benefit this patient who has been diagnosed with CAD. He states that he had increased muscle soreness on Lipitor in the past. A trial of Crestor is suggested.   Beta Blocker Therapy: Beta blocker therapy might benefit this patient per your discretion.   ACE/ARB Therapy: Per your discretion.   Diet: Low-sodium, low-carbohydrate, Mediterranean diet. Written instruction on the Mediterranean diet was provided. We discussed his ideal weight and dietary modifications that will promote weight loss and decrease his risk of further CAD. He is amenable to changing his diet.   Exercise: 30-45 minutes most days of the week once cleared to do so by their referring cardiologist. Cardiac rehabilitation would likely benefit this patient after surgery ([facility] would be convenient per patient.)   Smoking: This patient is a non-smoker.   Stress Management: Instruction on mindfulness meditation was provided.   Sleep: Weight loss encouraged. Patient feels his weight has increased mostly due to increased nighttime snacking. He will stop this as well as make other dietary modifications to promote weight loss. If he continues to snore despite weight loss or if he is unable to lose weight he would likely benefit from a sleep study.     Thank you for the opportunity to see this patient. Please feel free to contact Prevention if there are any questions, or if you feel that your patient would benefit from continued follow-up visits with the Program.

## 2017-11-07 LAB
ALBUMIN SERPL ELPH-MCNC: 4.3 G/DL — SIGNIFICANT CHANGE UP (ref 3.3–5)
ALP SERPL-CCNC: 47 U/L — SIGNIFICANT CHANGE UP (ref 40–120)
ALT FLD-CCNC: 36 U/L — SIGNIFICANT CHANGE UP (ref 10–45)
ANION GAP SERPL CALC-SCNC: 10 MMOL/L — SIGNIFICANT CHANGE UP (ref 5–17)
APTT BLD: 29.2 SEC — SIGNIFICANT CHANGE UP (ref 27.5–37.4)
AST SERPL-CCNC: 28 U/L — SIGNIFICANT CHANGE UP (ref 10–40)
BASOPHILS NFR BLD AUTO: 0.3 % — SIGNIFICANT CHANGE UP (ref 0–2)
BILIRUB SERPL-MCNC: 0.6 MG/DL — SIGNIFICANT CHANGE UP (ref 0.2–1.2)
BUN SERPL-MCNC: 16 MG/DL — SIGNIFICANT CHANGE UP (ref 7–23)
CALCIUM SERPL-MCNC: 9.2 MG/DL — SIGNIFICANT CHANGE UP (ref 8.4–10.5)
CHLORIDE SERPL-SCNC: 103 MMOL/L — SIGNIFICANT CHANGE UP (ref 96–108)
CO2 SERPL-SCNC: 26 MMOL/L — SIGNIFICANT CHANGE UP (ref 22–31)
CREAT SERPL-MCNC: 1.03 MG/DL — SIGNIFICANT CHANGE UP (ref 0.5–1.3)
EOSINOPHIL NFR BLD AUTO: 2.1 % — SIGNIFICANT CHANGE UP (ref 0–6)
GLUCOSE SERPL-MCNC: 98 MG/DL — SIGNIFICANT CHANGE UP (ref 70–99)
HCT VFR BLD CALC: 37.9 % — LOW (ref 39–50)
HGB BLD-MCNC: 13.4 G/DL — SIGNIFICANT CHANGE UP (ref 13–17)
INR BLD: 1.14 — SIGNIFICANT CHANGE UP (ref 0.88–1.16)
LYMPHOCYTES # BLD AUTO: 38.2 % — SIGNIFICANT CHANGE UP (ref 13–44)
MAGNESIUM SERPL-MCNC: 2.2 MG/DL — SIGNIFICANT CHANGE UP (ref 1.6–2.6)
MCHC RBC-ENTMCNC: 28.8 PG — SIGNIFICANT CHANGE UP (ref 27–34)
MCHC RBC-ENTMCNC: 35.4 G/DL — SIGNIFICANT CHANGE UP (ref 32–36)
MCV RBC AUTO: 81.5 FL — SIGNIFICANT CHANGE UP (ref 80–100)
MONOCYTES NFR BLD AUTO: 7.3 % — SIGNIFICANT CHANGE UP (ref 2–14)
NEUTROPHILS NFR BLD AUTO: 52.1 % — SIGNIFICANT CHANGE UP (ref 43–77)
PHOSPHATE SERPL-MCNC: 4.3 MG/DL — SIGNIFICANT CHANGE UP (ref 2.5–4.5)
PLATELET # BLD AUTO: 237 K/UL — SIGNIFICANT CHANGE UP (ref 150–400)
POTASSIUM SERPL-MCNC: 4.3 MMOL/L — SIGNIFICANT CHANGE UP (ref 3.5–5.3)
POTASSIUM SERPL-SCNC: 4.3 MMOL/L — SIGNIFICANT CHANGE UP (ref 3.5–5.3)
PROT SERPL-MCNC: 6.7 G/DL — SIGNIFICANT CHANGE UP (ref 6–8.3)
PROTHROM AB SERPL-ACNC: 12.7 SEC — SIGNIFICANT CHANGE UP (ref 9.8–12.7)
RBC # BLD: 4.65 M/UL — SIGNIFICANT CHANGE UP (ref 4.2–5.8)
RBC # FLD: 11.9 % — SIGNIFICANT CHANGE UP (ref 10.3–16.9)
SODIUM SERPL-SCNC: 139 MMOL/L — SIGNIFICANT CHANGE UP (ref 135–145)
T3FREE SERPL-MCNC: 2.35 PG/ML — SIGNIFICANT CHANGE UP (ref 1.71–3.71)
T4 FREE SERPL-MCNC: 0.95 NG/DL — SIGNIFICANT CHANGE UP (ref 0.7–1.48)
TSH SERPL-MCNC: 1.57 UIU/ML — SIGNIFICANT CHANGE UP (ref 0.35–4.94)
WBC # BLD: 5.8 K/UL — SIGNIFICANT CHANGE UP (ref 3.8–10.5)
WBC # FLD AUTO: 5.8 K/UL — SIGNIFICANT CHANGE UP (ref 3.8–10.5)

## 2017-11-07 PROCEDURE — 94010 BREATHING CAPACITY TEST: CPT | Mod: 26

## 2017-11-07 PROCEDURE — 71010: CPT | Mod: 26

## 2017-11-07 PROCEDURE — 93010 ELECTROCARDIOGRAM REPORT: CPT

## 2017-11-07 RX ORDER — METOPROLOL TARTRATE 50 MG
12.5 TABLET ORAL
Qty: 0 | Refills: 0 | Status: DISCONTINUED | OUTPATIENT
Start: 2017-11-07 | End: 2017-11-08

## 2017-11-07 RX ORDER — ATORVASTATIN CALCIUM 80 MG/1
40 TABLET, FILM COATED ORAL AT BEDTIME
Qty: 0 | Refills: 0 | Status: DISCONTINUED | OUTPATIENT
Start: 2017-11-07 | End: 2017-11-08

## 2017-11-07 RX ORDER — CHLORHEXIDINE GLUCONATE 213 G/1000ML
10 SOLUTION TOPICAL ONCE
Qty: 0 | Refills: 0 | Status: COMPLETED | OUTPATIENT
Start: 2017-11-07 | End: 2017-11-08

## 2017-11-07 RX ORDER — ALPRAZOLAM 0.25 MG
0.5 TABLET ORAL ONCE
Qty: 0 | Refills: 0 | Status: DISCONTINUED | OUTPATIENT
Start: 2017-11-07 | End: 2017-11-08

## 2017-11-07 RX ORDER — CHLORHEXIDINE GLUCONATE 213 G/1000ML
1 SOLUTION TOPICAL ONCE
Qty: 0 | Refills: 0 | Status: COMPLETED | OUTPATIENT
Start: 2017-11-07 | End: 2017-11-07

## 2017-11-07 RX ADMIN — SODIUM CHLORIDE 3 MILLILITER(S): 9 INJECTION INTRAMUSCULAR; INTRAVENOUS; SUBCUTANEOUS at 23:01

## 2017-11-07 RX ADMIN — Medication 81 MILLIGRAM(S): at 12:20

## 2017-11-07 RX ADMIN — CHLORHEXIDINE GLUCONATE 1 APPLICATION(S): 213 SOLUTION TOPICAL at 23:39

## 2017-11-07 RX ADMIN — ATORVASTATIN CALCIUM 40 MILLIGRAM(S): 80 TABLET, FILM COATED ORAL at 21:37

## 2017-11-07 RX ADMIN — FAMOTIDINE 20 MILLIGRAM(S): 10 INJECTION INTRAVENOUS at 18:53

## 2017-11-07 RX ADMIN — HEPARIN SODIUM 5000 UNIT(S): 5000 INJECTION INTRAVENOUS; SUBCUTANEOUS at 05:53

## 2017-11-07 RX ADMIN — HEPARIN SODIUM 5000 UNIT(S): 5000 INJECTION INTRAVENOUS; SUBCUTANEOUS at 21:37

## 2017-11-07 RX ADMIN — FAMOTIDINE 20 MILLIGRAM(S): 10 INJECTION INTRAVENOUS at 05:53

## 2017-11-07 RX ADMIN — Medication 12.5 MILLIGRAM(S): at 18:53

## 2017-11-07 RX ADMIN — HEPARIN SODIUM 5000 UNIT(S): 5000 INJECTION INTRAVENOUS; SUBCUTANEOUS at 16:06

## 2017-11-07 NOTE — PROGRESS NOTE ADULT - SUBJECTIVE AND OBJECTIVE BOX
Planned Date of Surgery:       11/8/17                                                                                                            Surgeon: Dr. Barahona     Procedure: AVR     HPI:  57yo male with PMHx significant for severe Aortic Stenosis (with MAYRA 0.6cm2) with bicuspid valve and childhood asthma who presents for cardiac catheterization to assess coronaries prior to scheduled AVR 11/7/17. Cardiac cath revealed non- obstructive CAD. (Mild LM, MIDLAD 30% stenosis, Diagnosis 100% with collaterals),  RAMus 75% stenosis, Prox Circ 30%, MID RCA 30% EF 60%. Patient will have AVR on Wednesday. Pre-op work up complete including thyroid US which revealed nodules without need for fine needle biospy with normal thyroid function labs.     PAST MEDICAL & SURGICAL HISTORY:  Aortic stenosis due to bicuspid aortic valve  Aortic stenosis, severe  History of kidney stones      No Known Allergies      MEDICATIONS  (STANDING):  aspirin  chewable 81 milliGRAM(s) Oral daily  atorvastatin 40 milliGRAM(s) Oral at bedtime  chlorhexidine 0.12% Liquid 10 milliLiter(s) Swish and Spit once  chlorhexidine 4% Liquid 1 Application(s) Topical once  famotidine    Tablet 20 milliGRAM(s) Oral two times a day  heparin  Injectable 5000 Unit(s) SubCutaneous every 8 hours  influenza   Vaccine 0.5 milliLiter(s) IntraMuscular once  metoprolol     tartrate 12.5 milliGRAM(s) Oral two times a day  sodium chloride 0.9% lock flush 3 milliLiter(s) IV Push Once    MEDICATIONS  (PRN):      On Beta Blocker? YES     Labs:                        13.4   5.8   )-----------( 237      ( 07 Nov 2017 06:21 )             37.9     11-07    139  |  103  |  16  ----------------------------<  98  4.3   |  26  |  1.03    Ca    9.2      07 Nov 2017 06:21  Phos  4.3     11-07  Mg     2.2     11-07    TPro  6.7  /  Alb  4.3  /  TBili  0.6  /  DBili  x   /  AST  28  /  ALT  36  /  AlkPhos  47  11-07    PT/INR - ( 07 Nov 2017 06:21 )   PT: 12.7 sec;   INR: 1.14          PTT - ( 07 Nov 2017 06:21 )  PTT:29.2 sec  Urinalysis Basic - ( 06 Nov 2017 14:53 )    Color: Yellow / Appearance: Clear / SG: <=1.005 / pH: x  Gluc: x / Ketone: NEGATIVE  / Bili: Negative / Urobili: 0.2 E.U./dL   Blood: x / Protein: NEGATIVE mg/dL / Nitrite: NEGATIVE   Leuk Esterase: NEGATIVE / RBC: x / WBC x   Sq Epi: x / Non Sq Epi: x / Bacteria: x      ABO Interpretation: B (11-06-17 @ 13:55)      CARDIAC MARKERS ( 06 Nov 2017 10:44 )  x     / x     / 107 U/L / x     / 2.9 ng/mL          Hgb A1C: 4.8     EKG: < from: 12 Lead ECG (11.06.17 @ 11:13) >  Ventricular Rate 69 BPM    Atrial Rate 69 BPM    P-R Interval 142 ms    QRS Duration 94 ms    Q-T Interval 380 ms    QTC Calculation(Bezet) 407 ms    P Axis 44 degrees    R Axis 63 degrees    T Axis 87 degrees    Diagnosis Line Normal sinus rhythm  Nonspecific T wave abnormality    < end of copied text >      CXR: < from: Xray Chest 1 View AP -PORTABLE-Routine (11.07.17 @ 07:32) >      INTERPRETATION:  CLINICAL INDICATION: 56-year-old for follow-up study.      FINDINGS: Portable view of the chest is compared to 11/6/2017 and   demonstrates midline trachea. Normal heart size. Mild apical pleural   thickening. No consolidation. No pleural effusion. No active chest   disease.            "Thank you for the opportunity to participate in the care of this   patient."    < end of copied text >      CT Scans: < from: CT Chest No Cont (10.30.17 @ 15:19) >  NTERPRETATION:  CT of the CHEST without intravenous contrast dated   10/30/2017 3:19 PM    INDICATION: Aortic stenosis. Preoperative examination prior to valve   replacement.    TECHNIQUE: CT of the chest was performed without intravenous contrast.    Intravenous contrast was not used , as requested. Axial, sagittal and   coronal images were produced and reviewed.    PRIOR STUDIES: None.    FINDINGS:  Evaluation of the vasculature is limited without IV contrast   at the due to lack of cardiac gating.    AORTA: The diameter of the aorta was measured at the following levels:    Aortic root (sinuses of Valsalva): 3.6 cm transversely.    Ascending aorta: 3.4 x 3.5 cm    Aortic arch: 2.7 x 2.8 cm    Proximal descending thoracic aorta: 3.0 x 2.8 cm    Distal descending thoracic aorta: 2.5 x 2.4 cm    Suprarenal abdominal aorta: 2.2 x 2.3 cm    Severe aortic valve calcifications consistent with the reported history   of aortic valve stenosis. Otherwise, no significant atherosclerotic   calcifications are seen throughout the thoracic aorta. Bovine aortic arch.    OTHER FINDINGS:   The heart is normal in size. Coronary artery calcifications.  No   pericardial effusion is seen. There is aortic valve and coronary artery   calcifications.    Evaluation for adenopathy is limited without intravenous contrast. Within   that limitation, no mediastinal, hilar or axillary lymphadenopathy is   seen. 9 mm low-density right thyroid nodule.    The lungs demonstrate bilateral apical scarring. There are a few   scattered small calcified granulomas bilaterally and a 2 mm right   perifissural nodule. There is a somewhat C shaped 1.6 x 1.6 x 0.9 cm left   lower lobe pleural-based nodular density.    Limited evaluation of the upper abdomen demonstrates no abnormality.    Evaluation of the osseous structures demonstrates mild degenerative   changes. A couple of subcentimeter sclerotic lesions likely representing   bone islands.    IMPRESSION:  Severe aortic valve calcification which is consistent with the reported   history of aortic valve stenosis.    9 mm right thyroid nodule.    Punctate calcified pulmonary nodules compatible with old granulomatous   disease.    1.6 x 1.6 x 0.9 cm left lower lobe pleural-based nodular density which   may represent a scarring, atelectasis or a pulmonary nodule. Comparison   with prior studies is recommended. In the absence of prior studies, a   PET/CT scan is suggested.            "Thank you for the opportunity to participate in the care of this   patient."    OLIVIA VASQUEZ M.D., RADIOLOGY RESIDENT  This document has been electronically signed.  ANASTACIO WALKER M.D., ATTENDING RADIOLOGIST  This document has been electronically signed. Oct 31 2017 11:43AM                < end of copied text >      Cath Report: LM mild, mLAD 30 %, D1 100 % L-L colaterals, Ramus 75 %, pCx 30 %, mRCA 30 %.  EF 60 % as per EZ 10/22/17  right radial @ 3 pm  critical AS with MAYRA 0.6 cm    Echo:   < from: EZ w/TTE (w/3D Echo) (10.12.17 @ 11:49) >  ------------------------------------------------------------------------  CONCLUSIONS:  1. Mitral annular calcification, otherwise normal mitral  valve. Mild mitral regurgitation.  2. Calcified bicuspid aortic valve with decreased opening.  The right and non-coronary cusps are fused and a calcified  midline raphe is visualized. Peak transaortic valve  gradient equals 89 mm Hg, mean transaortic valve gradient  equals 46 mm Hg, estimated aortic valve area equals 0.6  sqcm (by continuity equation and by planimetry), consistent  with severe aortic stenosis. Mild-moderate aortic  regurgitation.  3. Normal aortic root, aortic arch, and descending thoracic  aorta.  4. Normal left atrium.  No left atrium or left atrial  appendage thrombus.  5. Normal left ventricular internal dimensions and wall  thicknesses.  6. Normal left ventricular systolic function. No segmental  wall motion abnormalities.  7. Normal right ventricular size and function.  8. Contrast injection demonstrates no evidence of a patent  foramen ovale.  ------------------------------------------------------------------------  Confirmed on  10/12/2017 - 13:57:28 by Jorge Pal M.D.  ------------------------------------------------------------------------    < end of copied text >    PFT: completed.     Carotid Duplex: < from:  Duplex Carotid Arteries Complete, Bilateral (11.06.17 @ 17:26) >  IMPRESSION:  No hemodynamically significant carotid artery stenosis bilaterally.                  "Thank you for the opportunity to participate in the care of this   patient."    < end of copied text >      Consult in Chart?  YES   Consent in Chart? YES   Pre-op Orders Placed? YES   Blood Prodeucts Ordered? YES  NPO ordered? YES

## 2017-11-08 ENCOUNTER — APPOINTMENT (OUTPATIENT)
Dept: CARDIOTHORACIC SURGERY | Facility: HOSPITAL | Age: 57
End: 2017-11-08

## 2017-11-08 ENCOUNTER — APPOINTMENT (OUTPATIENT)
Dept: THORACIC SURGERY | Facility: HOSPITAL | Age: 57
End: 2017-11-08

## 2017-11-08 PROBLEM — I35.0 NONRHEUMATIC AORTIC (VALVE) STENOSIS: Chronic | Status: ACTIVE | Noted: 2017-11-03

## 2017-11-08 PROBLEM — Q23.0 CONGENITAL STENOSIS OF AORTIC VALVE: Chronic | Status: ACTIVE | Noted: 2017-11-03

## 2017-11-08 LAB
ALBUMIN SERPL ELPH-MCNC: 3.7 G/DL — SIGNIFICANT CHANGE UP (ref 3.3–5)
ALBUMIN SERPL ELPH-MCNC: 4.2 G/DL — SIGNIFICANT CHANGE UP (ref 3.3–5)
ALP SERPL-CCNC: 33 U/L — LOW (ref 40–120)
ALP SERPL-CCNC: 38 U/L — LOW (ref 40–120)
ALT FLD-CCNC: 26 U/L — SIGNIFICANT CHANGE UP (ref 10–45)
ALT FLD-CCNC: 31 U/L — SIGNIFICANT CHANGE UP (ref 10–45)
ANION GAP SERPL CALC-SCNC: 12 MMOL/L — SIGNIFICANT CHANGE UP (ref 5–17)
ANION GAP SERPL CALC-SCNC: 14 MMOL/L — SIGNIFICANT CHANGE UP (ref 5–17)
ANION GAP SERPL CALC-SCNC: 15 MMOL/L — SIGNIFICANT CHANGE UP (ref 5–17)
ANION GAP SERPL CALC-SCNC: 16 MMOL/L — SIGNIFICANT CHANGE UP (ref 5–17)
APTT BLD: 27.9 SEC — SIGNIFICANT CHANGE UP (ref 27.5–37.4)
APTT BLD: 32 SEC — SIGNIFICANT CHANGE UP (ref 27.5–37.4)
APTT BLD: 38.5 SEC — HIGH (ref 27.5–37.4)
APTT BLD: 54.8 SEC — HIGH (ref 27.5–37.4)
AST SERPL-CCNC: 55 U/L — HIGH (ref 10–40)
AST SERPL-CCNC: 57 U/L — HIGH (ref 10–40)
BASE EXCESS BLDA CALC-SCNC: -0.6 MMOL/L — SIGNIFICANT CHANGE UP (ref -2–3)
BASE EXCESS BLDA CALC-SCNC: -2.7 MMOL/L — LOW (ref -2–3)
BASE EXCESS BLDA CALC-SCNC: 1.2 MMOL/L — SIGNIFICANT CHANGE UP (ref -2–3)
BASOPHILS NFR BLD AUTO: 0.5 % — SIGNIFICANT CHANGE UP (ref 0–2)
BILIRUB SERPL-MCNC: 0.6 MG/DL — SIGNIFICANT CHANGE UP (ref 0.2–1.2)
BILIRUB SERPL-MCNC: 0.8 MG/DL — SIGNIFICANT CHANGE UP (ref 0.2–1.2)
BUN SERPL-MCNC: 14 MG/DL — SIGNIFICANT CHANGE UP (ref 7–23)
BUN SERPL-MCNC: 14 MG/DL — SIGNIFICANT CHANGE UP (ref 7–23)
BUN SERPL-MCNC: 15 MG/DL — SIGNIFICANT CHANGE UP (ref 7–23)
BUN SERPL-MCNC: 17 MG/DL — SIGNIFICANT CHANGE UP (ref 7–23)
CA-I BLDA-SCNC: 1.17 MMOL/L — SIGNIFICANT CHANGE UP (ref 1.12–1.3)
CALCIUM SERPL-MCNC: 8.7 MG/DL — SIGNIFICANT CHANGE UP (ref 8.4–10.5)
CALCIUM SERPL-MCNC: 8.8 MG/DL — SIGNIFICANT CHANGE UP (ref 8.4–10.5)
CALCIUM SERPL-MCNC: 9.2 MG/DL — SIGNIFICANT CHANGE UP (ref 8.4–10.5)
CALCIUM SERPL-MCNC: 9.7 MG/DL — SIGNIFICANT CHANGE UP (ref 8.4–10.5)
CHLORIDE SERPL-SCNC: 104 MMOL/L — SIGNIFICANT CHANGE UP (ref 96–108)
CHLORIDE SERPL-SCNC: 104 MMOL/L — SIGNIFICANT CHANGE UP (ref 96–108)
CHLORIDE SERPL-SCNC: 106 MMOL/L — SIGNIFICANT CHANGE UP (ref 96–108)
CHLORIDE SERPL-SCNC: 96 MMOL/L — SIGNIFICANT CHANGE UP (ref 96–108)
CO2 SERPL-SCNC: 20 MMOL/L — LOW (ref 22–31)
CO2 SERPL-SCNC: 22 MMOL/L — SIGNIFICANT CHANGE UP (ref 22–31)
CO2 SERPL-SCNC: 23 MMOL/L — SIGNIFICANT CHANGE UP (ref 22–31)
CO2 SERPL-SCNC: 25 MMOL/L — SIGNIFICANT CHANGE UP (ref 22–31)
COHGB MFR BLDA: 0.7 % — SIGNIFICANT CHANGE UP
CREAT SERPL-MCNC: 0.83 MG/DL — SIGNIFICANT CHANGE UP (ref 0.5–1.3)
CREAT SERPL-MCNC: 0.96 MG/DL — SIGNIFICANT CHANGE UP (ref 0.5–1.3)
CREAT SERPL-MCNC: 0.97 MG/DL — SIGNIFICANT CHANGE UP (ref 0.5–1.3)
CREAT SERPL-MCNC: 0.99 MG/DL — SIGNIFICANT CHANGE UP (ref 0.5–1.3)
EOSINOPHIL NFR BLD AUTO: 1.5 % — SIGNIFICANT CHANGE UP (ref 0–6)
GAS PNL BLDA: SIGNIFICANT CHANGE UP
GLUCOSE BLDC GLUCOMTR-MCNC: 107 MG/DL — HIGH (ref 70–99)
GLUCOSE BLDC GLUCOMTR-MCNC: 119 MG/DL — HIGH (ref 70–99)
GLUCOSE BLDC GLUCOMTR-MCNC: 120 MG/DL — HIGH (ref 70–99)
GLUCOSE BLDC GLUCOMTR-MCNC: 123 MG/DL — HIGH (ref 70–99)
GLUCOSE BLDC GLUCOMTR-MCNC: 138 MG/DL — HIGH (ref 70–99)
GLUCOSE BLDC GLUCOMTR-MCNC: 153 MG/DL — HIGH (ref 70–99)
GLUCOSE BLDC GLUCOMTR-MCNC: 166 MG/DL — HIGH (ref 70–99)
GLUCOSE BLDC GLUCOMTR-MCNC: 193 MG/DL — HIGH (ref 70–99)
GLUCOSE BLDC GLUCOMTR-MCNC: 94 MG/DL — SIGNIFICANT CHANGE UP (ref 70–99)
GLUCOSE SERPL-MCNC: 107 MG/DL — HIGH (ref 70–99)
GLUCOSE SERPL-MCNC: 115 MG/DL — HIGH (ref 70–99)
GLUCOSE SERPL-MCNC: 116 MG/DL — HIGH (ref 70–99)
GLUCOSE SERPL-MCNC: 215 MG/DL — HIGH (ref 70–99)
HCO3 BLDA-SCNC: 22 MMOL/L — SIGNIFICANT CHANGE UP (ref 21–28)
HCO3 BLDA-SCNC: 24 MMOL/L — SIGNIFICANT CHANGE UP (ref 21–28)
HCO3 BLDA-SCNC: 25 MMOL/L — SIGNIFICANT CHANGE UP (ref 21–28)
HCT VFR BLD CALC: 28.1 % — LOW (ref 39–50)
HCT VFR BLD CALC: 30.7 % — LOW (ref 39–50)
HCT VFR BLD CALC: 32.2 % — LOW (ref 39–50)
HCT VFR BLD CALC: 41.4 % — SIGNIFICANT CHANGE UP (ref 39–50)
HGB BLD-MCNC: 11.2 G/DL — LOW (ref 13–17)
HGB BLD-MCNC: 11.3 G/DL — LOW (ref 13–17)
HGB BLD-MCNC: 14.7 G/DL — SIGNIFICANT CHANGE UP (ref 13–17)
HGB BLD-MCNC: 9.7 G/DL — LOW (ref 13–17)
HGB BLDA-MCNC: 14.2 G/DL — SIGNIFICANT CHANGE UP (ref 13–17)
INR BLD: 1.14 — SIGNIFICANT CHANGE UP (ref 0.88–1.16)
INR BLD: 1.32 — HIGH (ref 0.88–1.16)
INR BLD: 1.34 — HIGH (ref 0.88–1.16)
LACTATE SERPL-SCNC: 1.1 MMOL/L — SIGNIFICANT CHANGE UP (ref 0.5–2)
LACTATE SERPL-SCNC: 2.1 MMOL/L — HIGH (ref 0.5–2)
LACTATE SERPL-SCNC: 3.5 MMOL/L — HIGH (ref 0.5–2)
LYMPHOCYTES # BLD AUTO: 38.6 % — SIGNIFICANT CHANGE UP (ref 13–44)
LYMPHOCYTES # BLD AUTO: 6 % — LOW (ref 13–44)
MAGNESIUM SERPL-MCNC: 2.3 MG/DL — SIGNIFICANT CHANGE UP (ref 1.6–2.6)
MCHC RBC-ENTMCNC: 28.1 PG — SIGNIFICANT CHANGE UP (ref 27–34)
MCHC RBC-ENTMCNC: 28.3 PG — SIGNIFICANT CHANGE UP (ref 27–34)
MCHC RBC-ENTMCNC: 28.9 PG — SIGNIFICANT CHANGE UP (ref 27–34)
MCHC RBC-ENTMCNC: 29.2 PG — SIGNIFICANT CHANGE UP (ref 27–34)
MCHC RBC-ENTMCNC: 34.5 G/DL — SIGNIFICANT CHANGE UP (ref 32–36)
MCHC RBC-ENTMCNC: 35.1 G/DL — SIGNIFICANT CHANGE UP (ref 32–36)
MCHC RBC-ENTMCNC: 35.5 G/DL — SIGNIFICANT CHANGE UP (ref 32–36)
MCHC RBC-ENTMCNC: 36.5 G/DL — HIGH (ref 32–36)
MCV RBC AUTO: 79.9 FL — LOW (ref 80–100)
MCV RBC AUTO: 80.5 FL — SIGNIFICANT CHANGE UP (ref 80–100)
MCV RBC AUTO: 81.3 FL — SIGNIFICANT CHANGE UP (ref 80–100)
MCV RBC AUTO: 81.4 FL — SIGNIFICANT CHANGE UP (ref 80–100)
METHGB MFR BLDA: 0.5 % — SIGNIFICANT CHANGE UP
MONOCYTES NFR BLD AUTO: 8 % — SIGNIFICANT CHANGE UP (ref 2–14)
MONOCYTES NFR BLD AUTO: 8.4 % — SIGNIFICANT CHANGE UP (ref 2–14)
NEUTROPHILS NFR BLD AUTO: 51 % — SIGNIFICANT CHANGE UP (ref 43–77)
NEUTROPHILS NFR BLD AUTO: 72 % — SIGNIFICANT CHANGE UP (ref 43–77)
O2 CT VFR BLDA CALC: 20.8 ML/DL — SIGNIFICANT CHANGE UP (ref 15–23)
OXYHGB MFR BLDA: 98 % — SIGNIFICANT CHANGE UP (ref 94–100)
PCO2 BLDA: 36 MMHG — SIGNIFICANT CHANGE UP (ref 35–48)
PCO2 BLDA: 37 MMHG — SIGNIFICANT CHANGE UP (ref 35–48)
PCO2 BLDA: 38 MMHG — SIGNIFICANT CHANGE UP (ref 35–48)
PH BLDA: 7.39 — SIGNIFICANT CHANGE UP (ref 7.35–7.45)
PH BLDA: 7.41 — SIGNIFICANT CHANGE UP (ref 7.35–7.45)
PH BLDA: 7.46 — HIGH (ref 7.35–7.45)
PHOSPHATE SERPL-MCNC: 3.3 MG/DL — SIGNIFICANT CHANGE UP (ref 2.5–4.5)
PHOSPHATE SERPL-MCNC: 4.4 MG/DL — SIGNIFICANT CHANGE UP (ref 2.5–4.5)
PLATELET # BLD AUTO: 169 K/UL — SIGNIFICANT CHANGE UP (ref 150–400)
PLATELET # BLD AUTO: 198 K/UL — SIGNIFICANT CHANGE UP (ref 150–400)
PLATELET # BLD AUTO: 263 K/UL — SIGNIFICANT CHANGE UP (ref 150–400)
PLATELET # BLD AUTO: 265 K/UL — SIGNIFICANT CHANGE UP (ref 150–400)
PO2 BLDA: 130 MMHG — HIGH (ref 83–108)
PO2 BLDA: 169 MMHG — HIGH (ref 83–108)
PO2 BLDA: 448 MMHG — HIGH (ref 83–108)
POTASSIUM BLDA-SCNC: 3.8 MMOL/L — SIGNIFICANT CHANGE UP (ref 3.5–4.9)
POTASSIUM SERPL-MCNC: 3.7 MMOL/L — SIGNIFICANT CHANGE UP (ref 3.5–5.3)
POTASSIUM SERPL-MCNC: 4.2 MMOL/L — SIGNIFICANT CHANGE UP (ref 3.5–5.3)
POTASSIUM SERPL-MCNC: 4.2 MMOL/L — SIGNIFICANT CHANGE UP (ref 3.5–5.3)
POTASSIUM SERPL-MCNC: 4.8 MMOL/L — SIGNIFICANT CHANGE UP (ref 3.5–5.3)
POTASSIUM SERPL-SCNC: 3.7 MMOL/L — SIGNIFICANT CHANGE UP (ref 3.5–5.3)
POTASSIUM SERPL-SCNC: 4.2 MMOL/L — SIGNIFICANT CHANGE UP (ref 3.5–5.3)
POTASSIUM SERPL-SCNC: 4.2 MMOL/L — SIGNIFICANT CHANGE UP (ref 3.5–5.3)
POTASSIUM SERPL-SCNC: 4.8 MMOL/L — SIGNIFICANT CHANGE UP (ref 3.5–5.3)
PROT SERPL-MCNC: 5.7 G/DL — LOW (ref 6–8.3)
PROT SERPL-MCNC: 6 G/DL — SIGNIFICANT CHANGE UP (ref 6–8.3)
PROTHROM AB SERPL-ACNC: 12.7 SEC — SIGNIFICANT CHANGE UP (ref 9.8–12.7)
PROTHROM AB SERPL-ACNC: 14.7 SEC — HIGH (ref 9.8–12.7)
PROTHROM AB SERPL-ACNC: 15 SEC — HIGH (ref 9.8–12.7)
RBC # BLD: 3.45 M/UL — LOW (ref 4.2–5.8)
RBC # BLD: 3.84 M/UL — LOW (ref 4.2–5.8)
RBC # BLD: 4 M/UL — LOW (ref 4.2–5.8)
RBC # BLD: 5.09 M/UL — SIGNIFICANT CHANGE UP (ref 4.2–5.8)
RBC # FLD: 11.5 % — SIGNIFICANT CHANGE UP (ref 10.3–16.9)
RBC # FLD: 11.6 % — SIGNIFICANT CHANGE UP (ref 10.3–16.9)
RBC # FLD: 11.8 % — SIGNIFICANT CHANGE UP (ref 10.3–16.9)
RBC # FLD: 11.8 % — SIGNIFICANT CHANGE UP (ref 10.3–16.9)
SAO2 % BLDA: 100 % — SIGNIFICANT CHANGE UP (ref 95–100)
SAO2 % BLDA: 98 % — SIGNIFICANT CHANGE UP (ref 95–100)
SAO2 % BLDA: 99 % — SIGNIFICANT CHANGE UP (ref 95–100)
SODIUM BLDA-SCNC: 140 MMOL/L — SIGNIFICANT CHANGE UP (ref 138–146)
SODIUM SERPL-SCNC: 135 MMOL/L — SIGNIFICANT CHANGE UP (ref 135–145)
SODIUM SERPL-SCNC: 139 MMOL/L — SIGNIFICANT CHANGE UP (ref 135–145)
SODIUM SERPL-SCNC: 140 MMOL/L — SIGNIFICANT CHANGE UP (ref 135–145)
SODIUM SERPL-SCNC: 143 MMOL/L — SIGNIFICANT CHANGE UP (ref 135–145)
WBC # BLD: 19 K/UL — HIGH (ref 3.8–10.5)
WBC # BLD: 19.4 K/UL — HIGH (ref 3.8–10.5)
WBC # BLD: 5.5 K/UL — SIGNIFICANT CHANGE UP (ref 3.8–10.5)
WBC # BLD: 9.5 K/UL — SIGNIFICANT CHANGE UP (ref 3.8–10.5)
WBC # FLD AUTO: 19 K/UL — HIGH (ref 3.8–10.5)
WBC # FLD AUTO: 19.4 K/UL — HIGH (ref 3.8–10.5)
WBC # FLD AUTO: 5.5 K/UL — SIGNIFICANT CHANGE UP (ref 3.8–10.5)
WBC # FLD AUTO: 9.5 K/UL — SIGNIFICANT CHANGE UP (ref 3.8–10.5)

## 2017-11-08 PROCEDURE — 33405 REPLACEMENT AORTIC VALVE OPN: CPT | Mod: AS

## 2017-11-08 PROCEDURE — 32656 THORACOSCOPY W/PLEURECTOMY: CPT | Mod: LT

## 2017-11-08 PROCEDURE — 71010: CPT | Mod: 26

## 2017-11-08 PROCEDURE — 32666 THORACOSCOPY W/WEDGE RESECT: CPT

## 2017-11-08 PROCEDURE — 33405 REPLACEMENT AORTIC VALVE OPN: CPT

## 2017-11-08 PROCEDURE — 99291 CRITICAL CARE FIRST HOUR: CPT

## 2017-11-08 PROCEDURE — 93010 ELECTROCARDIOGRAM REPORT: CPT

## 2017-11-08 PROCEDURE — 99292 CRITICAL CARE ADDL 30 MIN: CPT

## 2017-11-08 RX ORDER — INSULIN HUMAN 100 [IU]/ML
1 INJECTION, SOLUTION SUBCUTANEOUS
Qty: 100 | Refills: 0 | Status: DISCONTINUED | OUTPATIENT
Start: 2017-11-08 | End: 2017-11-08

## 2017-11-08 RX ORDER — DEXTROSE 50 % IN WATER 50 %
25 SYRINGE (ML) INTRAVENOUS
Qty: 0 | Refills: 0 | Status: DISCONTINUED | OUTPATIENT
Start: 2017-11-08 | End: 2017-11-09

## 2017-11-08 RX ORDER — CHLORHEXIDINE GLUCONATE 213 G/1000ML
5 SOLUTION TOPICAL EVERY 4 HOURS
Qty: 0 | Refills: 0 | Status: DISCONTINUED | OUTPATIENT
Start: 2017-11-08 | End: 2017-11-08

## 2017-11-08 RX ORDER — DEXTROSE 50 % IN WATER 50 %
50 SYRINGE (ML) INTRAVENOUS
Qty: 0 | Refills: 0 | Status: DISCONTINUED | OUTPATIENT
Start: 2017-11-08 | End: 2017-11-09

## 2017-11-08 RX ORDER — CEFAZOLIN SODIUM 1 G
2000 VIAL (EA) INJECTION EVERY 8 HOURS
Qty: 0 | Refills: 0 | Status: COMPLETED | OUTPATIENT
Start: 2017-11-08 | End: 2017-11-10

## 2017-11-08 RX ORDER — HEPARIN SODIUM 5000 [USP'U]/ML
5000 INJECTION INTRAVENOUS; SUBCUTANEOUS EVERY 8 HOURS
Qty: 0 | Refills: 0 | Status: DISCONTINUED | OUTPATIENT
Start: 2017-11-08 | End: 2017-11-11

## 2017-11-08 RX ORDER — FAMOTIDINE 10 MG/ML
20 INJECTION INTRAVENOUS
Qty: 0 | Refills: 0 | Status: DISCONTINUED | OUTPATIENT
Start: 2017-11-08 | End: 2017-11-11

## 2017-11-08 RX ORDER — POTASSIUM CHLORIDE 20 MEQ
10 PACKET (EA) ORAL ONCE
Qty: 0 | Refills: 0 | Status: COMPLETED | OUTPATIENT
Start: 2017-11-08 | End: 2017-11-08

## 2017-11-08 RX ORDER — DOCUSATE SODIUM 100 MG
100 CAPSULE ORAL THREE TIMES A DAY
Qty: 0 | Refills: 0 | Status: DISCONTINUED | OUTPATIENT
Start: 2017-11-08 | End: 2017-11-11

## 2017-11-08 RX ORDER — ACETAMINOPHEN 500 MG
1000 TABLET ORAL ONCE
Qty: 0 | Refills: 0 | Status: COMPLETED | OUTPATIENT
Start: 2017-11-08 | End: 2017-11-08

## 2017-11-08 RX ORDER — DEXMEDETOMIDINE HYDROCHLORIDE IN 0.9% SODIUM CHLORIDE 4 UG/ML
0.7 INJECTION INTRAVENOUS
Qty: 200 | Refills: 0 | Status: DISCONTINUED | OUTPATIENT
Start: 2017-11-08 | End: 2017-11-08

## 2017-11-08 RX ORDER — SENNA PLUS 8.6 MG/1
2 TABLET ORAL AT BEDTIME
Qty: 0 | Refills: 0 | Status: DISCONTINUED | OUTPATIENT
Start: 2017-11-08 | End: 2017-11-11

## 2017-11-08 RX ORDER — VASOPRESSIN 20 [USP'U]/ML
0.01 INJECTION INTRAVENOUS
Qty: 100 | Refills: 0 | Status: DISCONTINUED | OUTPATIENT
Start: 2017-11-08 | End: 2017-11-08

## 2017-11-08 RX ORDER — ASPIRIN/CALCIUM CARB/MAGNESIUM 324 MG
81 TABLET ORAL DAILY
Qty: 0 | Refills: 0 | Status: DISCONTINUED | OUTPATIENT
Start: 2017-11-08 | End: 2017-11-11

## 2017-11-08 RX ORDER — MAGNESIUM SULFATE 500 MG/ML
2 VIAL (ML) INJECTION ONCE
Qty: 0 | Refills: 0 | Status: COMPLETED | OUTPATIENT
Start: 2017-11-08 | End: 2017-11-08

## 2017-11-08 RX ORDER — FENTANYL CITRATE 50 UG/ML
12.5 INJECTION INTRAVENOUS ONCE
Qty: 0 | Refills: 0 | Status: DISCONTINUED | OUTPATIENT
Start: 2017-11-08 | End: 2017-11-08

## 2017-11-08 RX ORDER — FAMOTIDINE 10 MG/ML
20 INJECTION INTRAVENOUS EVERY 12 HOURS
Qty: 0 | Refills: 0 | Status: DISCONTINUED | OUTPATIENT
Start: 2017-11-08 | End: 2017-11-08

## 2017-11-08 RX ORDER — FENTANYL CITRATE 50 UG/ML
25 INJECTION INTRAVENOUS ONCE
Qty: 0 | Refills: 0 | Status: DISCONTINUED | OUTPATIENT
Start: 2017-11-08 | End: 2017-11-08

## 2017-11-08 RX ORDER — INSULIN HUMAN 100 [IU]/ML
2 INJECTION, SOLUTION SUBCUTANEOUS
Qty: 100 | Refills: 0 | Status: DISCONTINUED | OUTPATIENT
Start: 2017-11-08 | End: 2017-11-09

## 2017-11-08 RX ORDER — ALBUMIN HUMAN 25 %
250 VIAL (ML) INTRAVENOUS
Qty: 0 | Refills: 0 | Status: COMPLETED | OUTPATIENT
Start: 2017-11-08 | End: 2017-11-08

## 2017-11-08 RX ORDER — NOREPINEPHRINE BITARTRATE/D5W 8 MG/250ML
0.02 PLASTIC BAG, INJECTION (ML) INTRAVENOUS
Qty: 8 | Refills: 0 | Status: DISCONTINUED | OUTPATIENT
Start: 2017-11-08 | End: 2017-11-09

## 2017-11-08 RX ORDER — CALCIUM GLUCONATE 100 MG/ML
2 VIAL (ML) INTRAVENOUS ONCE
Qty: 0 | Refills: 0 | Status: COMPLETED | OUTPATIENT
Start: 2017-11-08 | End: 2017-11-08

## 2017-11-08 RX ORDER — POLYETHYLENE GLYCOL 3350 17 G/17G
17 POWDER, FOR SOLUTION ORAL DAILY
Qty: 0 | Refills: 0 | Status: DISCONTINUED | OUTPATIENT
Start: 2017-11-08 | End: 2017-11-11

## 2017-11-08 RX ORDER — MORPHINE SULFATE 50 MG/1
2 CAPSULE, EXTENDED RELEASE ORAL ONCE
Qty: 0 | Refills: 0 | Status: DISCONTINUED | OUTPATIENT
Start: 2017-11-08 | End: 2017-11-08

## 2017-11-08 RX ORDER — SODIUM CHLORIDE 9 MG/ML
1000 INJECTION, SOLUTION INTRAVENOUS
Qty: 0 | Refills: 0 | Status: DISCONTINUED | OUTPATIENT
Start: 2017-11-08 | End: 2017-11-11

## 2017-11-08 RX ADMIN — Medication 400 MILLIGRAM(S): at 21:03

## 2017-11-08 RX ADMIN — FENTANYL CITRATE 12.5 MICROGRAM(S): 50 INJECTION INTRAVENOUS at 18:26

## 2017-11-08 RX ADMIN — Medication 0.5 MILLIGRAM(S): at 00:27

## 2017-11-08 RX ADMIN — Medication 1000 MILLIGRAM(S): at 21:40

## 2017-11-08 RX ADMIN — Medication 100 MILLIGRAM(S): at 19:17

## 2017-11-08 RX ADMIN — CHLORHEXIDINE GLUCONATE 10 MILLILITER(S): 213 SOLUTION TOPICAL at 06:26

## 2017-11-08 RX ADMIN — FENTANYL CITRATE 25 MICROGRAM(S): 50 INJECTION INTRAVENOUS at 16:11

## 2017-11-08 RX ADMIN — HEPARIN SODIUM 5000 UNIT(S): 5000 INJECTION INTRAVENOUS; SUBCUTANEOUS at 06:25

## 2017-11-08 RX ADMIN — MORPHINE SULFATE 2 MILLIGRAM(S): 50 CAPSULE, EXTENDED RELEASE ORAL at 14:30

## 2017-11-08 RX ADMIN — Medication 400 GRAM(S): at 20:45

## 2017-11-08 RX ADMIN — Medication 50 MILLIEQUIVALENT(S): at 14:15

## 2017-11-08 RX ADMIN — MORPHINE SULFATE 2 MILLIGRAM(S): 50 CAPSULE, EXTENDED RELEASE ORAL at 14:54

## 2017-11-08 RX ADMIN — Medication 125 MILLILITER(S): at 19:00

## 2017-11-08 RX ADMIN — HEPARIN SODIUM 5000 UNIT(S): 5000 INJECTION INTRAVENOUS; SUBCUTANEOUS at 22:08

## 2017-11-08 RX ADMIN — Medication 50 GRAM(S): at 15:49

## 2017-11-08 RX ADMIN — FAMOTIDINE 20 MILLIGRAM(S): 10 INJECTION INTRAVENOUS at 06:25

## 2017-11-08 RX ADMIN — Medication 100 MILLIGRAM(S): at 22:08

## 2017-11-08 RX ADMIN — Medication 12.5 MILLIGRAM(S): at 06:25

## 2017-11-08 RX ADMIN — SENNA PLUS 2 TABLET(S): 8.6 TABLET ORAL at 22:08

## 2017-11-08 RX ADMIN — INSULIN HUMAN 2 UNIT(S)/HR: 100 INJECTION, SOLUTION SUBCUTANEOUS at 15:45

## 2017-11-08 RX ADMIN — Medication 3.41 MICROGRAM(S)/KG/MIN: at 20:00

## 2017-11-08 RX ADMIN — CHLORHEXIDINE GLUCONATE 5 MILLILITER(S): 213 SOLUTION TOPICAL at 14:52

## 2017-11-08 RX ADMIN — Medication 125 MILLILITER(S): at 16:30

## 2017-11-08 RX ADMIN — Medication 50 MILLIEQUIVALENT(S): at 15:04

## 2017-11-08 RX ADMIN — FENTANYL CITRATE 25 MICROGRAM(S): 50 INJECTION INTRAVENOUS at 15:45

## 2017-11-08 RX ADMIN — DEXMEDETOMIDINE HYDROCHLORIDE IN 0.9% SODIUM CHLORIDE 15.91 MICROGRAM(S)/KG/HR: 4 INJECTION INTRAVENOUS at 14:54

## 2017-11-08 RX ADMIN — CHLORHEXIDINE GLUCONATE 5 MILLILITER(S): 213 SOLUTION TOPICAL at 18:23

## 2017-11-08 RX ADMIN — SODIUM CHLORIDE 10 MILLILITER(S): 9 INJECTION, SOLUTION INTRAVENOUS at 14:00

## 2017-11-08 RX ADMIN — FAMOTIDINE 20 MILLIGRAM(S): 10 INJECTION INTRAVENOUS at 18:24

## 2017-11-08 NOTE — PROGRESS NOTE ADULT - ASSESSMENT
55yo with history of bicuspid aortic valve and severe AS, on imaging pt also noted to have LLL pleural based nodule.  Pt underwent AVR and Left VATS LLL resection today.  Uncomplicated case, arrived on low dose levo    Neuro -- pain control, sedation wean and extubated  CVS - hemodynamic support, monitor for arrhythmia. Pt with short runs of non-sustained VT, electrolytes repleted.  Monitor for bleeding  wean pressors as tolerated  chest tube management  Pulm - vent weaned and extubated   Pt s/p Left lower lobe VATS for removal of nodule (? fibrous)  which was found to be benign.  GI - GI proph   - monitor UOP  Endo - glycemic control  Heme - correct coagulapathy, monitor for bleeding  ID - periop antibiotics.       Critical post op.    Critical care time spent 50 min

## 2017-11-08 NOTE — PROGRESS NOTE ADULT - SUBJECTIVE AND OBJECTIVE BOX
CTICU  CRITICAL  CARE  attending     Hand off received 					   Pertinent clinical, laboratory, radiographic, hemodynamic, echocardiographic, respiratory data, microbiologic data and chart were reviewed and analyzed frequently throughout the course of the day and night  Patient seen and examined with CTS/ SH attending at bedside  Pt is a 56y , Male, HEALTH ISSUES - PROBLEM Dx:      , FAMILY HISTORY:  Family history of esophageal cancer (Father)  Family history of heart attack (Father)  PAST MEDICAL & SURGICAL HISTORY:  Aortic stenosis due to bicuspid aortic valve  Aortic stenosis, severe  History of kidney stones    Patient is a 56y old  Male who presents with a chief complaint of Severe AS (03 Nov 2017 13:20)      14 system review was unremarkable  acute changes include acute respiratory failure  Vital signs, hemodynamic and respiratory parameters were reviewed from the bedside nursing flowsheet.  ICU Vital Signs Last 24 Hrs  T(C): 36.4 (08 Nov 2017 20:35), Max: 36.6 (07 Nov 2017 21:34)  T(F): 97.6 (08 Nov 2017 20:35), Max: 97.8 (07 Nov 2017 21:34)  HR: 84 (08 Nov 2017 21:00) (62 - 98)  BP: 113/58 (08 Nov 2017 21:00) (113/58 - 157/75)  BP(mean): 74 (08 Nov 2017 21:00) (74 - 107)  ABP: 134/62 (08 Nov 2017 21:00) (92/54 - 134/62)  ABP(mean): 80 (08 Nov 2017 21:00) (64 - 84)  RR: 12 (08 Nov 2017 21:00) (12 - 24)  SpO2: 98% (08 Nov 2017 21:00) (95% - 100%)    Adult Advanced Hemodynamics Last 24 Hrs  CVP(mm Hg): 14 (08 Nov 2017 21:00) (1 - 19)  CVP(cm H2O): --  CO: --  CI: --  PA: --  PA(mean): --  PCWP: --  SVR: --  SVRI: --  PVR: --  PVRI: --, ABG - ( 08 Nov 2017 20:01 )  pH: 7.43  /  pCO2: 35    /  pO2: 111   / HCO3: 23    / Base Excess: -1.0  /  SaO2: 98                Mode: AC/ CMV (Assist Control/ Continuous Mandatory Ventilation)  RR (machine): 12  TV (machine): 750  FiO2: 50  PEEP: 5  ITime: 1  MAP: 12  PIP: 28    Intake and output was reviewed and the fluid balance was calculated  Daily Height in cm: 182.88 (07 Nov 2017 23:05)    Daily   I&O's Summary    07 Nov 2017 07:01  -  08 Nov 2017 07:00  --------------------------------------------------------  IN: 0 mL / OUT: 2050 mL / NET: -2050 mL    08 Nov 2017 07:01  -  08 Nov 2017 21:28  --------------------------------------------------------  IN: 856.3 mL / OUT: 955 mL / NET: -98.7 mL        All lines and drain sites were assessed  Glycemic trend was reviewedCAPILLARY BLOOD GLUCOSE  138 (08 Nov 2017 18:00)      POCT Blood Glucose.: 119 mg/dL (08 Nov 2017 19:54)    No acute change in mental status  Auscultation of the chest reveals equal bs  Abdomen is soft  Extremities are warm and well perfused  Wounds appear clean and unremarkable  Antibiotics are periop    labs  CBC Full  -  ( 08 Nov 2017 15:35 )  WBC Count : 19.4 K/uL  Hemoglobin : 11.3 g/dL  Hematocrit : 32.2 %  Platelet Count - Automated : 263 K/uL  Mean Cell Volume : 80.5 fL  Mean Cell Hemoglobin : 28.3 pg  Mean Cell Hemoglobin Concentration : 35.1 g/dL  Auto Neutrophil # : x  Auto Lymphocyte # : x  Auto Monocyte # : x  Auto Eosinophil # : x  Auto Basophil # : x  Auto Neutrophil % : x  Auto Lymphocyte % : x  Auto Monocyte % : x  Auto Eosinophil % : x  Auto Basophil % : x    11-08    140  |  104  |  15  ----------------------------<  215<H>  4.2   |  20<L>  |  0.97    Ca    8.8      08 Nov 2017 15:35  Phos  4.4     11-08  Mg     2.3     11-08    TPro  6.0  /  Alb  4.2  /  TBili  0.8  /  DBili  x   /  AST  55<H>  /  ALT  31  /  AlkPhos  38<L>  11-08    PT/INR - ( 08 Nov 2017 14:02 )   PT: 15.0 sec;   INR: 1.34          PTT - ( 08 Nov 2017 15:35 )  PTT:54.8 sec  The current medications were reviewed   MEDICATIONS  (STANDING):  aspirin enteric coated 81 milliGRAM(s) Oral daily  ceFAZolin   IVPB 2000 milliGRAM(s) IV Intermittent every 8 hours  dextrose 50% Injectable 50 milliLiter(s) IV Push every 15 minutes  dextrose 50% Injectable 25 milliLiter(s) IV Push every 15 minutes  docusate sodium 100 milliGRAM(s) Oral three times a day  famotidine    Tablet 20 milliGRAM(s) Oral two times a day  heparin  Injectable 5000 Unit(s) SubCutaneous every 8 hours  influenza   Vaccine 0.5 milliLiter(s) IntraMuscular once  insulin Infusion 2 Unit(s)/Hr (2 mL/Hr) IV Continuous <Continuous>  norepinephrine Infusion 0.02 MICROgram(s)/kG/Min (3.409 mL/Hr) IV Continuous <Continuous>  senna 2 Tablet(s) Oral at bedtime  sodium chloride 0.45%. 1000 milliLiter(s) (10 mL/Hr) IV Continuous <Continuous>    MEDICATIONS  (PRN):  polyethylene glycol 3350 17 Gram(s) Oral daily PRN Constipation       PROBLEM LIST/ ASSESSMENT:  HEALTH ISSUES - PROBLEM Dx:      ,   Patient is a 56y old  Male who presents with a chief complaint of Severe AS (03 Nov 2017 13:20)     s/p   acute changes include acute respiratory failure    My plan includes :  close hemodynamic, ventilatory and drain monitoring and management per post op routine    Monitor for arrhythmias and monitor parameters for organ perfusion  monitor neurologic status  Head of the bed should remain elevated to 45 deg .   chest PT and IS will be encouraged  monitor adequacy of oxygenation and ventilation and attempt to wean oxygen  Nutritional goals will be met using po eventually , ensure adequate caloric intake and montior the same  Stress ulcer and VTE prophylaxis will be achieved    Glycemic control is satisfactory  Electrolytes have been repleted as necessary and wound care has been carried out. Pain control has been achieved.   agressive physical therapy and early mobility and ambulation goals will be met   The family was updated about the course and plan  CRITICAL CARE TIME SPENT in evaluation and management, reassessments, review and interpretation of labs and x-rays, ventilator and hemodynamic management, formulating a plan and coordinating care: ___90____ MIN.  Time does not include procedural time.  CTICU ATTENDING     					    Daniel Kasper MD CTICU  CRITICAL  CARE  attending     Hand off received @ 7p					   Pertinent clinical, laboratory, radiographic, hemodynamic, echocardiographic, respiratory data, microbiologic data and chart were reviewed and analyzed frequently throughout the course of the day and night  Patient seen and examined with CTS/ SH attending at bedside    Pt is a 56y , Male, opday; s/p AVR;    weaned and extubated  weaned off precedex  off drips      , FAMILY HISTORY:  Family history of esophageal cancer (Father)  Family history of heart attack (Father)  PAST MEDICAL & SURGICAL HISTORY:  Aortic stenosis due to bicuspid aortic valve  Aortic stenosis, severe  History of kidney stones    Patient is a 56y old  Male who presents with a chief complaint of Severe AS (03 Nov 2017 13:20)      14 system review was unremarkable  acute changes include acute respiratory failure  Vital signs, hemodynamic and respiratory parameters were reviewed from the bedside nursing flowsheet.  ICU Vital Signs Last 24 Hrs  T(C): 36.4 (08 Nov 2017 20:35), Max: 36.6 (07 Nov 2017 21:34)  T(F): 97.6 (08 Nov 2017 20:35), Max: 97.8 (07 Nov 2017 21:34)  HR: 84 (08 Nov 2017 21:00) (62 - 98)  BP: 113/58 (08 Nov 2017 21:00) (113/58 - 157/75)  BP(mean): 74 (08 Nov 2017 21:00) (74 - 107)  ABP: 134/62 (08 Nov 2017 21:00) (92/54 - 134/62)  ABP(mean): 80 (08 Nov 2017 21:00) (64 - 84)  RR: 12 (08 Nov 2017 21:00) (12 - 24)  SpO2: 98% (08 Nov 2017 21:00) (95% - 100%)    Adult Advanced Hemodynamics Last 24 Hrs  CVP(mm Hg): 14 (08 Nov 2017 21:00) (1 - 19)  CVP(cm H2O): --  CO: --  CI: --  PA: --  PA(mean): --  PCWP: --  SVR: --  SVRI: --  PVR: --  PVRI: --, ABG - ( 08 Nov 2017 20:01 )  pH: 7.43  /  pCO2: 35    /  pO2: 111   / HCO3: 23    / Base Excess: -1.0  /  SaO2: 98                Mode: AC/ CMV (Assist Control/ Continuous Mandatory Ventilation)  RR (machine): 12  TV (machine): 750  FiO2: 50  PEEP: 5  ITime: 1  MAP: 12  PIP: 28    Intake and output was reviewed and the fluid balance was calculated  Daily Height in cm: 182.88 (07 Nov 2017 23:05)    Daily   I&O's Summary    07 Nov 2017 07:01  -  08 Nov 2017 07:00  --------------------------------------------------------  IN: 0 mL / OUT: 2050 mL / NET: -2050 mL    08 Nov 2017 07:01  -  08 Nov 2017 21:28  --------------------------------------------------------  IN: 856.3 mL / OUT: 955 mL / NET: -98.7 mL        All lines and drain sites were assessed  Glycemic trend was reviewedCAPILLARY BLOOD GLUCOSE  138 (08 Nov 2017 18:00)      POCT Blood Glucose.: 119 mg/dL (08 Nov 2017 19:54)    No acute change in mental status  Auscultation of the chest reveals equal bs  Abdomen is soft  Extremities are warm and well perfused  Wounds appear clean and unremarkable  Antibiotics are periop    labs  CBC Full  -  ( 08 Nov 2017 15:35 )  WBC Count : 19.4 K/uL  Hemoglobin : 11.3 g/dL  Hematocrit : 32.2 %  Platelet Count - Automated : 263 K/uL  Mean Cell Volume : 80.5 fL  Mean Cell Hemoglobin : 28.3 pg  Mean Cell Hemoglobin Concentration : 35.1 g/dL  Auto Neutrophil # : x  Auto Lymphocyte # : x  Auto Monocyte # : x  Auto Eosinophil # : x  Auto Basophil # : x  Auto Neutrophil % : x  Auto Lymphocyte % : x  Auto Monocyte % : x  Auto Eosinophil % : x  Auto Basophil % : x    11-08    140  |  104  |  15  ----------------------------<  215<H>  4.2   |  20<L>  |  0.97    Ca    8.8      08 Nov 2017 15:35  Phos  4.4     11-08  Mg     2.3     11-08    TPro  6.0  /  Alb  4.2  /  TBili  0.8  /  DBili  x   /  AST  55<H>  /  ALT  31  /  AlkPhos  38<L>  11-08    PT/INR - ( 08 Nov 2017 14:02 )   PT: 15.0 sec;   INR: 1.34          PTT - ( 08 Nov 2017 15:35 )  PTT:54.8 sec  The current medications were reviewed   MEDICATIONS  (STANDING):  aspirin enteric coated 81 milliGRAM(s) Oral daily  ceFAZolin   IVPB 2000 milliGRAM(s) IV Intermittent every 8 hours  dextrose 50% Injectable 50 milliLiter(s) IV Push every 15 minutes  dextrose 50% Injectable 25 milliLiter(s) IV Push every 15 minutes  docusate sodium 100 milliGRAM(s) Oral three times a day  famotidine    Tablet 20 milliGRAM(s) Oral two times a day  heparin  Injectable 5000 Unit(s) SubCutaneous every 8 hours  influenza   Vaccine 0.5 milliLiter(s) IntraMuscular once  insulin Infusion 2 Unit(s)/Hr (2 mL/Hr) IV Continuous <Continuous>  norepinephrine Infusion 0.02 MICROgram(s)/kG/Min (3.409 mL/Hr) IV Continuous <Continuous>  senna 2 Tablet(s) Oral at bedtime  sodium chloride 0.45%. 1000 milliLiter(s) (10 mL/Hr) IV Continuous <Continuous>    MEDICATIONS  (PRN):  polyethylene glycol 3350 17 Gram(s) Oral daily PRN Constipation       PROBLEM LIST/ ASSESSMENT:  HEALTH ISSUES - PROBLEM Dx:    s/p AVR        ,   Patient is a 56y old  Male who presents with a chief complaint of Severe AS (03 Nov 2017 13:20)     s/p   acute changes include acute respiratory failure    My plan includes :  close hemodynamic, ventilatory and drain monitoring and management per post op routine  Monitor for arrhythmias and monitor parameters for organ perfusion  monitor neurologic status  Head of the bed should remain elevated to 45 deg .   chest PT and IS will be encouraged  monitor adequacy of oxygenation and ventilation and attempt to wean oxygen  Nutritional goals will be met using po eventually , ensure adequate caloric intake and montior the same  Stress ulcer and VTE prophylaxis will be achieved    Glycemic control is satisfactory  Electrolytes have been repleted as necessary and wound care has been carried out. Pain control has been achieved.   agressive physical therapy and early mobility and ambulation goals will be met   The family was updated about the course and plan  CRITICAL CARE TIME SPENT in evaluation and management, reassessments, review and interpretation of labs and x-rays, ventilator and hemodynamic management, formulating a plan and coordinating care: __40__ MIN.  Time does not include procedural time.  CTICU ATTENDING     					    Jose Antonio Lopez MD

## 2017-11-08 NOTE — BRIEF OPERATIVE NOTE - PROCEDURE
<<-----Click on this checkbox to enter Procedure VATS (video-assisted thoracoscopic surgery)  11/08/2017  Left VATS LLL wedge resection with en bloc resection of pleural mass by Dr. Trotter  [facility]  [facility] valve replacement  11/08/2017  #25 bio  EF 60%  Active  [facility]

## 2017-11-08 NOTE — PROGRESS NOTE ADULT - SUBJECTIVE AND OBJECTIVE BOX
Date of surgery : 11/8/2017    Surgeon : Gabino    Anesthesia : Rj     Procedure : AVR, VATS LLL wedge resection    Pump Time :                                      Aortic X -Clamp :                                                Circulatory Arrest :    EF :     Pre OP :                                   Post OP :                                                      Assist Device :    Airway :      Difficult Airway :                   [ ] Yes     [x ] No      ETT             Size :                         Lip Line :                           Ventilator setting :              [x ] ON          [ x] BS +           [x ] ETCO2       Mode: AC/ CMV (Assist Control/ Continuous Mandatory Ventilation)  RR (machine): 12  TV (machine): 750  FiO2: 50  PEEP: 5  ITime: 1  MAP: 12  PIP: 28          Lines :      [ ] PA catheter      [ ] Radial Arterial Line              [  ] Right              [  ] Left       [ ] Femoral Arterial Line          [  ] Right              [  ] Left      [ ] Central Line   IJ                     [  ] Right              [  ] Left      [ ] Femoral Central line            [  ] Right              [  ] Left     Tubes :      Blakes :                                      [  ] Med.             [  ] Pleural      Chest Tubes :                           [x  ] Med.             [  bilateral ] Pleural       Pacing     Wires :             [ x  ] Atrial                  [  x ]  Venticular      Pacer Setting :                   Threshold  :                Sensitivity :       Intake     Drips :   Levo 0.01mcg/kg/min     IVF : 2500ml     Albumin : 500ml     Product :             [   ]  PRBC       [  ] Platelet     [   ] FFP          [   ] Cryoprecipitate                                  [   ]  Cell saver                                  [   ]  Hemostatic agent :                                  [   ]   Factors :       Output      EBL :      Urine : 1150ml      Antibiotics : Ancef   ICU Vital Signs Last 24 Hrs  T(C): 36.4 (11-08-17 @ 20:35), Max: 36.6 (11-08-17 @ 13:40)  T(F): 97.6 (11-08-17 @ 20:35), Max: 97.8 (11-08-17 @ 13:40)  HR: 84 (11-08-17 @ 21:00) (62 - 98)  BP: 113/58 (11-08-17 @ 21:00) (113/58 - 157/75)  BP(mean): 74 (11-08-17 @ 21:00) (74 - 107)  ABP: 134/62 (11-08-17 @ 21:00) (92/54 - 134/62)  ABP(mean): 80 (11-08-17 @ 21:00) (64 - 84)  RR: 12 (11-08-17 @ 21:00) (12 - 24)  SpO2: 98% (11-08-17 @ 21:00) (95% - 100%)    I&O's Summary    07 Nov 2017 07:01  -  08 Nov 2017 07:00  --------------------------------------------------------  IN: 0 mL / OUT: 2050 mL / NET: -2050 mL    08 Nov 2017 07:01  -  08 Nov 2017 21:45  --------------------------------------------------------  IN: 1069.3 mL / OUT: 1060 mL / NET: 9.3 mL      ABG - ( 08 Nov 2017 20:01 )  pH: 7.43  /  pCO2: 35    /  pO2: 111   / HCO3: 23    / Base Excess: -1.0  /  SaO2: 98                                  11.3   19.4  )-----------( 263      ( 08 Nov 2017 15:35 )             32.2     08 Nov 2017 15:35    140    |  104    |  15     ----------------------------<  215    4.2     |  20     |  0.97     Ca    8.8        08 Nov 2017 15:35  Phos  4.4       08 Nov 2017 06:45  Mg     2.3       08 Nov 2017 15:35    TPro  6.0    /  Alb  4.2    /  TBili  0.8    /  DBili  x      /  AST  55     /  ALT  31     /  AlkPhos  38     08 Nov 2017 15:35    PT/INR - ( 08 Nov 2017 14:02 )   PT: 15.0 sec;   INR: 1.34     PTT - ( 08 Nov 2017 15:35 )  PTT:54.8 sec  MEDICATIONS  (STANDING):  aspirin enteric coated 81 milliGRAM(s) Oral daily  ceFAZolin   IVPB 2000 milliGRAM(s) IV Intermittent every 8 hours  dextrose 50% Injectable 50 milliLiter(s) IV Push every 15 minutes  dextrose 50% Injectable 25 milliLiter(s) IV Push every 15 minutes  docusate sodium 100 milliGRAM(s) Oral three times a day  famotidine    Tablet 20 milliGRAM(s) Oral two times a day  heparin  Injectable 5000 Unit(s) SubCutaneous every 8 hours  influenza   Vaccine 0.5 milliLiter(s) IntraMuscular once  insulin Infusion 2 Unit(s)/Hr IV Continuous <Continuous>  norepinephrine Infusion 0.02 MICROgram(s)/kG/Min IV Continuous <Continuous>  senna 2 Tablet(s) Oral at bedtime      AORTIC STENOSIS  Nonrheumatic aortic valve stenosis  Aortic valve replacement  VATS (video-assisted thoracoscopic surgery)  History of kidney stones      PHYSICAL EXAM:  Gen : arrived intubated sedated  Respiratory: decreased in the bases  Cardiovascular: S1 and S2, RRR, no M/G/R  Gastrointestinal: BS+, soft, NT/ND  Extremities: No peripheral edema  Vascular: 2+ peripheral pulses  Neurological:  no focal deficits  Incision: clean dry/ no sign of infection  Lines: no sign of infection

## 2017-11-09 LAB
ALBUMIN SERPL ELPH-MCNC: 3.6 G/DL — SIGNIFICANT CHANGE UP (ref 3.3–5)
ALBUMIN SERPL ELPH-MCNC: 3.7 G/DL — SIGNIFICANT CHANGE UP (ref 3.3–5)
ALP SERPL-CCNC: 33 U/L — LOW (ref 40–120)
ALP SERPL-CCNC: 34 U/L — LOW (ref 40–120)
ALT FLD-CCNC: 18 U/L — SIGNIFICANT CHANGE UP (ref 10–45)
ALT FLD-CCNC: 20 U/L — SIGNIFICANT CHANGE UP (ref 10–45)
ANION GAP SERPL CALC-SCNC: 12 MMOL/L — SIGNIFICANT CHANGE UP (ref 5–17)
ANION GAP SERPL CALC-SCNC: 12 MMOL/L — SIGNIFICANT CHANGE UP (ref 5–17)
ANION GAP SERPL CALC-SCNC: 13 MMOL/L — SIGNIFICANT CHANGE UP (ref 5–17)
APTT BLD: 29.5 SEC — SIGNIFICANT CHANGE UP (ref 27.5–37.4)
APTT BLD: 30.4 SEC — SIGNIFICANT CHANGE UP (ref 27.5–37.4)
APTT BLD: 31.7 SEC — SIGNIFICANT CHANGE UP (ref 27.5–37.4)
AST SERPL-CCNC: 47 U/L — HIGH (ref 10–40)
AST SERPL-CCNC: 56 U/L — HIGH (ref 10–40)
BILIRUB SERPL-MCNC: 0.5 MG/DL — SIGNIFICANT CHANGE UP (ref 0.2–1.2)
BILIRUB SERPL-MCNC: 0.6 MG/DL — SIGNIFICANT CHANGE UP (ref 0.2–1.2)
BUN SERPL-MCNC: 13 MG/DL — SIGNIFICANT CHANGE UP (ref 7–23)
BUN SERPL-MCNC: 13 MG/DL — SIGNIFICANT CHANGE UP (ref 7–23)
BUN SERPL-MCNC: 15 MG/DL — SIGNIFICANT CHANGE UP (ref 7–23)
CALCIUM SERPL-MCNC: 8.6 MG/DL — SIGNIFICANT CHANGE UP (ref 8.4–10.5)
CALCIUM SERPL-MCNC: 8.9 MG/DL — SIGNIFICANT CHANGE UP (ref 8.4–10.5)
CALCIUM SERPL-MCNC: 9.1 MG/DL — SIGNIFICANT CHANGE UP (ref 8.4–10.5)
CHLORIDE SERPL-SCNC: 101 MMOL/L — SIGNIFICANT CHANGE UP (ref 96–108)
CHLORIDE SERPL-SCNC: 96 MMOL/L — SIGNIFICANT CHANGE UP (ref 96–108)
CHLORIDE SERPL-SCNC: 99 MMOL/L — SIGNIFICANT CHANGE UP (ref 96–108)
CO2 SERPL-SCNC: 23 MMOL/L — SIGNIFICANT CHANGE UP (ref 22–31)
CO2 SERPL-SCNC: 24 MMOL/L — SIGNIFICANT CHANGE UP (ref 22–31)
CO2 SERPL-SCNC: 24 MMOL/L — SIGNIFICANT CHANGE UP (ref 22–31)
CREAT SERPL-MCNC: 0.78 MG/DL — SIGNIFICANT CHANGE UP (ref 0.5–1.3)
CREAT SERPL-MCNC: 0.92 MG/DL — SIGNIFICANT CHANGE UP (ref 0.5–1.3)
CREAT SERPL-MCNC: 0.98 MG/DL — SIGNIFICANT CHANGE UP (ref 0.5–1.3)
GAS PNL BLDA: SIGNIFICANT CHANGE UP
GLUCOSE BLDC GLUCOMTR-MCNC: 101 MG/DL — HIGH (ref 70–99)
GLUCOSE BLDC GLUCOMTR-MCNC: 101 MG/DL — HIGH (ref 70–99)
GLUCOSE BLDC GLUCOMTR-MCNC: 105 MG/DL — HIGH (ref 70–99)
GLUCOSE BLDC GLUCOMTR-MCNC: 126 MG/DL — HIGH (ref 70–99)
GLUCOSE BLDC GLUCOMTR-MCNC: 136 MG/DL — HIGH (ref 70–99)
GLUCOSE BLDC GLUCOMTR-MCNC: 142 MG/DL — HIGH (ref 70–99)
GLUCOSE BLDC GLUCOMTR-MCNC: 150 MG/DL — HIGH (ref 70–99)
GLUCOSE BLDC GLUCOMTR-MCNC: 155 MG/DL — HIGH (ref 70–99)
GLUCOSE BLDC GLUCOMTR-MCNC: 93 MG/DL — SIGNIFICANT CHANGE UP (ref 70–99)
GLUCOSE BLDC GLUCOMTR-MCNC: 93 MG/DL — SIGNIFICANT CHANGE UP (ref 70–99)
GLUCOSE BLDC GLUCOMTR-MCNC: 98 MG/DL — SIGNIFICANT CHANGE UP (ref 70–99)
GLUCOSE SERPL-MCNC: 130 MG/DL — HIGH (ref 70–99)
GLUCOSE SERPL-MCNC: 143 MG/DL — HIGH (ref 70–99)
GLUCOSE SERPL-MCNC: 160 MG/DL — HIGH (ref 70–99)
HCT VFR BLD CALC: 27.3 % — LOW (ref 39–50)
HCT VFR BLD CALC: 27.3 % — LOW (ref 39–50)
HCT VFR BLD CALC: 29.6 % — LOW (ref 39–50)
HGB BLD-MCNC: 10.8 G/DL — LOW (ref 13–17)
HGB BLD-MCNC: 9.2 G/DL — LOW (ref 13–17)
HGB BLD-MCNC: 9.7 G/DL — LOW (ref 13–17)
INR BLD: 1.41 — HIGH (ref 0.88–1.16)
INR BLD: 1.42 — HIGH (ref 0.88–1.16)
INR BLD: 1.45 — HIGH (ref 0.88–1.16)
LACTATE SERPL-SCNC: 1.2 MMOL/L — SIGNIFICANT CHANGE UP (ref 0.5–2)
MAGNESIUM SERPL-MCNC: 2.1 MG/DL — SIGNIFICANT CHANGE UP (ref 1.6–2.6)
MAGNESIUM SERPL-MCNC: 2.2 MG/DL — SIGNIFICANT CHANGE UP (ref 1.6–2.6)
MAGNESIUM SERPL-MCNC: 2.2 MG/DL — SIGNIFICANT CHANGE UP (ref 1.6–2.6)
MCHC RBC-ENTMCNC: 28.3 PG — SIGNIFICANT CHANGE UP (ref 27–34)
MCHC RBC-ENTMCNC: 28.8 PG — SIGNIFICANT CHANGE UP (ref 27–34)
MCHC RBC-ENTMCNC: 29.8 PG — SIGNIFICANT CHANGE UP (ref 27–34)
MCHC RBC-ENTMCNC: 33.7 G/DL — SIGNIFICANT CHANGE UP (ref 32–36)
MCHC RBC-ENTMCNC: 35.5 G/DL — SIGNIFICANT CHANGE UP (ref 32–36)
MCHC RBC-ENTMCNC: 36.5 G/DL — HIGH (ref 32–36)
MCV RBC AUTO: 81 FL — SIGNIFICANT CHANGE UP (ref 80–100)
MCV RBC AUTO: 81.8 FL — SIGNIFICANT CHANGE UP (ref 80–100)
MCV RBC AUTO: 84 FL — SIGNIFICANT CHANGE UP (ref 80–100)
PHOSPHATE SERPL-MCNC: 3.3 MG/DL — SIGNIFICANT CHANGE UP (ref 2.5–4.5)
PHOSPHATE SERPL-MCNC: 4.1 MG/DL — SIGNIFICANT CHANGE UP (ref 2.5–4.5)
PLATELET # BLD AUTO: 164 K/UL — SIGNIFICANT CHANGE UP (ref 150–400)
PLATELET # BLD AUTO: 179 K/UL — SIGNIFICANT CHANGE UP (ref 150–400)
PLATELET # BLD AUTO: 179 K/UL — SIGNIFICANT CHANGE UP (ref 150–400)
POTASSIUM SERPL-MCNC: 3.6 MMOL/L — SIGNIFICANT CHANGE UP (ref 3.5–5.3)
POTASSIUM SERPL-MCNC: 4 MMOL/L — SIGNIFICANT CHANGE UP (ref 3.5–5.3)
POTASSIUM SERPL-MCNC: 4.2 MMOL/L — SIGNIFICANT CHANGE UP (ref 3.5–5.3)
POTASSIUM SERPL-SCNC: 3.6 MMOL/L — SIGNIFICANT CHANGE UP (ref 3.5–5.3)
POTASSIUM SERPL-SCNC: 4 MMOL/L — SIGNIFICANT CHANGE UP (ref 3.5–5.3)
POTASSIUM SERPL-SCNC: 4.2 MMOL/L — SIGNIFICANT CHANGE UP (ref 3.5–5.3)
PROT SERPL-MCNC: 5.6 G/DL — LOW (ref 6–8.3)
PROT SERPL-MCNC: 6 G/DL — SIGNIFICANT CHANGE UP (ref 6–8.3)
PROTHROM AB SERPL-ACNC: 15.8 SEC — HIGH (ref 9.8–12.7)
PROTHROM AB SERPL-ACNC: 15.9 SEC — HIGH (ref 9.8–12.7)
PROTHROM AB SERPL-ACNC: 16.2 SEC — HIGH (ref 9.8–12.7)
RBC # BLD: 3.25 M/UL — LOW (ref 4.2–5.8)
RBC # BLD: 3.37 M/UL — LOW (ref 4.2–5.8)
RBC # BLD: 3.62 M/UL — LOW (ref 4.2–5.8)
RBC # FLD: 11.8 % — SIGNIFICANT CHANGE UP (ref 10.3–16.9)
RBC # FLD: 12.1 % — SIGNIFICANT CHANGE UP (ref 10.3–16.9)
RBC # FLD: 12.1 % — SIGNIFICANT CHANGE UP (ref 10.3–16.9)
SODIUM SERPL-SCNC: 132 MMOL/L — LOW (ref 135–145)
SODIUM SERPL-SCNC: 136 MMOL/L — SIGNIFICANT CHANGE UP (ref 135–145)
SODIUM SERPL-SCNC: 136 MMOL/L — SIGNIFICANT CHANGE UP (ref 135–145)
WBC # BLD: 11.1 K/UL — HIGH (ref 3.8–10.5)
WBC # BLD: 11.3 K/UL — HIGH (ref 3.8–10.5)
WBC # BLD: 14.8 K/UL — HIGH (ref 3.8–10.5)
WBC # FLD AUTO: 11.1 K/UL — HIGH (ref 3.8–10.5)
WBC # FLD AUTO: 11.3 K/UL — HIGH (ref 3.8–10.5)
WBC # FLD AUTO: 14.8 K/UL — HIGH (ref 3.8–10.5)

## 2017-11-09 PROCEDURE — 99291 CRITICAL CARE FIRST HOUR: CPT

## 2017-11-09 PROCEDURE — 93010 ELECTROCARDIOGRAM REPORT: CPT

## 2017-11-09 PROCEDURE — 71010: CPT | Mod: 26

## 2017-11-09 RX ORDER — FUROSEMIDE 40 MG
20 TABLET ORAL ONCE
Qty: 0 | Refills: 0 | Status: COMPLETED | OUTPATIENT
Start: 2017-11-09 | End: 2017-11-09

## 2017-11-09 RX ORDER — FENTANYL CITRATE 50 UG/ML
12.5 INJECTION INTRAVENOUS ONCE
Qty: 0 | Refills: 0 | Status: DISCONTINUED | OUTPATIENT
Start: 2017-11-09 | End: 2017-11-09

## 2017-11-09 RX ORDER — POTASSIUM CHLORIDE 20 MEQ
20 PACKET (EA) ORAL ONCE
Qty: 0 | Refills: 0 | Status: COMPLETED | OUTPATIENT
Start: 2017-11-09 | End: 2017-11-09

## 2017-11-09 RX ORDER — DEXTROSE 50 % IN WATER 50 %
25 SYRINGE (ML) INTRAVENOUS ONCE
Qty: 0 | Refills: 0 | Status: DISCONTINUED | OUTPATIENT
Start: 2017-11-09 | End: 2017-11-09

## 2017-11-09 RX ORDER — KETOROLAC TROMETHAMINE 30 MG/ML
30 SYRINGE (ML) INJECTION ONCE
Qty: 0 | Refills: 0 | Status: DISCONTINUED | OUTPATIENT
Start: 2017-11-09 | End: 2017-11-09

## 2017-11-09 RX ORDER — AMIODARONE HYDROCHLORIDE 400 MG/1
150 TABLET ORAL ONCE
Qty: 0 | Refills: 0 | Status: COMPLETED | OUTPATIENT
Start: 2017-11-09 | End: 2017-11-09

## 2017-11-09 RX ORDER — DEXTROSE 50 % IN WATER 50 %
1 SYRINGE (ML) INTRAVENOUS ONCE
Qty: 0 | Refills: 0 | Status: DISCONTINUED | OUTPATIENT
Start: 2017-11-09 | End: 2017-11-09

## 2017-11-09 RX ORDER — SODIUM CHLORIDE 9 MG/ML
1000 INJECTION, SOLUTION INTRAVENOUS
Qty: 0 | Refills: 0 | Status: DISCONTINUED | OUTPATIENT
Start: 2017-11-09 | End: 2017-11-09

## 2017-11-09 RX ORDER — POTASSIUM CHLORIDE 20 MEQ
20 PACKET (EA) ORAL
Qty: 0 | Refills: 0 | Status: COMPLETED | OUTPATIENT
Start: 2017-11-09 | End: 2017-11-09

## 2017-11-09 RX ORDER — ACETAMINOPHEN WITH CODEINE 300MG-30MG
2 TABLET ORAL EVERY 4 HOURS
Qty: 0 | Refills: 0 | Status: DISCONTINUED | OUTPATIENT
Start: 2017-11-09 | End: 2017-11-11

## 2017-11-09 RX ORDER — KETOROLAC TROMETHAMINE 30 MG/ML
15 SYRINGE (ML) INJECTION EVERY 8 HOURS
Qty: 0 | Refills: 0 | Status: DISCONTINUED | OUTPATIENT
Start: 2017-11-09 | End: 2017-11-10

## 2017-11-09 RX ORDER — BENZOCAINE AND MENTHOL 5; 1 G/100ML; G/100ML
1 LIQUID ORAL ONCE
Qty: 0 | Refills: 0 | Status: COMPLETED | OUTPATIENT
Start: 2017-11-09 | End: 2017-11-09

## 2017-11-09 RX ORDER — ALBUMIN HUMAN 25 %
250 VIAL (ML) INTRAVENOUS ONCE
Qty: 0 | Refills: 0 | Status: DISCONTINUED | OUTPATIENT
Start: 2017-11-09 | End: 2017-11-09

## 2017-11-09 RX ORDER — ACETAMINOPHEN WITH CODEINE 300MG-30MG
1 TABLET ORAL EVERY 4 HOURS
Qty: 0 | Refills: 0 | Status: DISCONTINUED | OUTPATIENT
Start: 2017-11-09 | End: 2017-11-11

## 2017-11-09 RX ORDER — GLUCAGON INJECTION, SOLUTION 0.5 MG/.1ML
1 INJECTION, SOLUTION SUBCUTANEOUS ONCE
Qty: 0 | Refills: 0 | Status: DISCONTINUED | OUTPATIENT
Start: 2017-11-09 | End: 2017-11-09

## 2017-11-09 RX ORDER — DEXTROSE 50 % IN WATER 50 %
12.5 SYRINGE (ML) INTRAVENOUS ONCE
Qty: 0 | Refills: 0 | Status: DISCONTINUED | OUTPATIENT
Start: 2017-11-09 | End: 2017-11-09

## 2017-11-09 RX ORDER — ALBUMIN HUMAN 25 %
250 VIAL (ML) INTRAVENOUS ONCE
Qty: 0 | Refills: 0 | Status: COMPLETED | OUTPATIENT
Start: 2017-11-09 | End: 2017-11-09

## 2017-11-09 RX ORDER — INSULIN LISPRO 100/ML
VIAL (ML) SUBCUTANEOUS
Qty: 0 | Refills: 0 | Status: DISCONTINUED | OUTPATIENT
Start: 2017-11-09 | End: 2017-11-11

## 2017-11-09 RX ORDER — ACETAMINOPHEN 500 MG
1000 TABLET ORAL ONCE
Qty: 0 | Refills: 0 | Status: COMPLETED | OUTPATIENT
Start: 2017-11-09 | End: 2017-11-09

## 2017-11-09 RX ORDER — INSULIN GLARGINE 100 [IU]/ML
10 INJECTION, SOLUTION SUBCUTANEOUS ONCE
Qty: 0 | Refills: 0 | Status: COMPLETED | OUTPATIENT
Start: 2017-11-09 | End: 2017-11-09

## 2017-11-09 RX ORDER — METOPROLOL TARTRATE 50 MG
12.5 TABLET ORAL EVERY 12 HOURS
Qty: 0 | Refills: 0 | Status: DISCONTINUED | OUTPATIENT
Start: 2017-11-09 | End: 2017-11-10

## 2017-11-09 RX ORDER — METOPROLOL TARTRATE 50 MG
12.5 TABLET ORAL ONCE
Qty: 0 | Refills: 0 | Status: COMPLETED | OUTPATIENT
Start: 2017-11-09 | End: 2017-11-09

## 2017-11-09 RX ADMIN — FENTANYL CITRATE 12.5 MICROGRAM(S): 50 INJECTION INTRAVENOUS at 02:30

## 2017-11-09 RX ADMIN — BENZOCAINE AND MENTHOL 1 LOZENGE: 5; 1 LIQUID ORAL at 01:35

## 2017-11-09 RX ADMIN — Medication 100 MILLIEQUIVALENT(S): at 23:17

## 2017-11-09 RX ADMIN — Medication 100 MILLIGRAM(S): at 21:42

## 2017-11-09 RX ADMIN — Medication 2 TABLET(S): at 10:17

## 2017-11-09 RX ADMIN — Medication 20 MILLIGRAM(S): at 05:26

## 2017-11-09 RX ADMIN — Medication 100 MILLIEQUIVALENT(S): at 23:52

## 2017-11-09 RX ADMIN — Medication 15 MILLIGRAM(S): at 13:40

## 2017-11-09 RX ADMIN — FAMOTIDINE 20 MILLIGRAM(S): 10 INJECTION INTRAVENOUS at 17:01

## 2017-11-09 RX ADMIN — FENTANYL CITRATE 12.5 MICROGRAM(S): 50 INJECTION INTRAVENOUS at 02:45

## 2017-11-09 RX ADMIN — FAMOTIDINE 20 MILLIGRAM(S): 10 INJECTION INTRAVENOUS at 06:48

## 2017-11-09 RX ADMIN — HEPARIN SODIUM 5000 UNIT(S): 5000 INJECTION INTRAVENOUS; SUBCUTANEOUS at 06:48

## 2017-11-09 RX ADMIN — Medication 100 MILLIGRAM(S): at 09:12

## 2017-11-09 RX ADMIN — Medication 30 MILLIGRAM(S): at 06:40

## 2017-11-09 RX ADMIN — Medication 100 MILLIGRAM(S): at 13:25

## 2017-11-09 RX ADMIN — Medication 2 TABLET(S): at 11:17

## 2017-11-09 RX ADMIN — AMIODARONE HYDROCHLORIDE 133.33 MILLIGRAM(S): 400 TABLET ORAL at 02:10

## 2017-11-09 RX ADMIN — HEPARIN SODIUM 5000 UNIT(S): 5000 INJECTION INTRAVENOUS; SUBCUTANEOUS at 13:48

## 2017-11-09 RX ADMIN — Medication 81 MILLIGRAM(S): at 11:55

## 2017-11-09 RX ADMIN — Medication 100 MILLIGRAM(S): at 00:45

## 2017-11-09 RX ADMIN — Medication 100 MILLIEQUIVALENT(S): at 04:16

## 2017-11-09 RX ADMIN — Medication 12.5 MILLIGRAM(S): at 06:49

## 2017-11-09 RX ADMIN — Medication 100 MILLIEQUIVALENT(S): at 07:08

## 2017-11-09 RX ADMIN — Medication 1000 MILLIGRAM(S): at 17:00

## 2017-11-09 RX ADMIN — Medication 125 MILLILITER(S): at 11:40

## 2017-11-09 RX ADMIN — Medication 30 MILLIGRAM(S): at 07:00

## 2017-11-09 RX ADMIN — Medication 15 MILLIGRAM(S): at 20:01

## 2017-11-09 RX ADMIN — Medication 1000 MILLIGRAM(S): at 01:25

## 2017-11-09 RX ADMIN — Medication 15 MILLIGRAM(S): at 13:25

## 2017-11-09 RX ADMIN — SENNA PLUS 2 TABLET(S): 8.6 TABLET ORAL at 21:42

## 2017-11-09 RX ADMIN — FENTANYL CITRATE 12.5 MICROGRAM(S): 50 INJECTION INTRAVENOUS at 05:45

## 2017-11-09 RX ADMIN — Medication 400 MILLIGRAM(S): at 01:06

## 2017-11-09 RX ADMIN — Medication 100 MILLIGRAM(S): at 17:00

## 2017-11-09 RX ADMIN — INSULIN GLARGINE 10 UNIT(S): 100 INJECTION, SOLUTION SUBCUTANEOUS at 07:35

## 2017-11-09 RX ADMIN — Medication 125 MILLILITER(S): at 13:49

## 2017-11-09 RX ADMIN — Medication 12.5 MILLIGRAM(S): at 22:45

## 2017-11-09 RX ADMIN — Medication 400 MILLIGRAM(S): at 16:26

## 2017-11-09 RX ADMIN — Medication 100 MILLIGRAM(S): at 06:48

## 2017-11-09 RX ADMIN — FENTANYL CITRATE 12.5 MICROGRAM(S): 50 INJECTION INTRAVENOUS at 05:26

## 2017-11-09 RX ADMIN — HEPARIN SODIUM 5000 UNIT(S): 5000 INJECTION INTRAVENOUS; SUBCUTANEOUS at 21:41

## 2017-11-09 RX ADMIN — Medication 1 TABLET(S): at 23:20

## 2017-11-09 RX ADMIN — Medication 15 MILLIGRAM(S): at 20:30

## 2017-11-09 NOTE — PHYSICAL THERAPY INITIAL EVALUATION ADULT - ACTIVE RANGE OF MOTION EXAMINATION, REHAB EVAL
bilateral upper extremity Active ROM was WFL (within functional limits)/within sternal precautions/bilateral  lower extremity Active ROM was WFL (within functional limits)

## 2017-11-09 NOTE — PHYSICAL THERAPY INITIAL EVALUATION ADULT - GENERAL OBSERVATIONS, REHAB EVAL
Received sitting in bedside chair with +bilateral chest tubes, +cordis, +NC 02 3L, +tele, + B SCDs, +sternotomy incision C/D/I. Reports pain is 7/10 with transfers.

## 2017-11-09 NOTE — PHYSICAL THERAPY INITIAL EVALUATION ADULT - PERTINENT HX OF CURRENT PROBLEM, REHAB EVAL
57yo male with PMHx significant for severe Aortic Stenosis (with MAYRA 0.6cm2) with bicuspid valve and childhood asthma who presents for cardiac catheterization to assess coronaries prior to scheduled AVR 11/7/17.

## 2017-11-09 NOTE — PROGRESS NOTE ADULT - SUBJECTIVE AND OBJECTIVE BOX
CTICU  CRITICAL  CARE  attending     Hand off received 					   Pertinent clinical, laboratory, radiographic, hemodynamic, echocardiographic, respiratory data, microbiologic data and chart were reviewed and analyzed frequently throughout the course of the day and night  Patient seen and examined with CTS/ SH attending at bedside  Pt is a 56y , Male, HEALTH ISSUES - PROBLEM Dx:      , FAMILY HISTORY:  Family history of esophageal cancer (Father)  Family history of heart attack (Father)  PAST MEDICAL & SURGICAL HISTORY:  Aortic stenosis due to bicuspid aortic valve  Aortic stenosis, severe  History of kidney stones    Patient is a 56y old  Male who presents with a chief complaint of Severe AS (10 Nov 2017 11:50)      14 system review was unremarkable  acute changes include acute respiratory failure  Vital signs, hemodynamic and respiratory parameters were reviewed from the bedside nursing flowsheet.  ICU Vital Signs Last 24 Hrs  T(C): 36.3 (10 Nov 2017 09:24), Max: 36.9 (09 Nov 2017 21:05)  T(F): 97.4 (10 Nov 2017 09:24), Max: 98.4 (09 Nov 2017 21:05)  HR: 90 (10 Nov 2017 11:36) (74 - 90)  BP: 138/71 (10 Nov 2017 11:36) (106/55 - 149/62)  BP(mean): 93 (10 Nov 2017 11:36) (69 - 94)  ABP: --  ABP(mean): --  RR: 14 (10 Nov 2017 11:36) (14 - 29)  SpO2: 99% (10 Nov 2017 11:36) (94% - 99%)    Adult Advanced Hemodynamics Last 24 Hrs  CVP(mm Hg): --  CVP(cm H2O): --  CO: --  CI: --  PA: --  PA(mean): --  PCWP: --  SVR: --  SVRI: --  PVR: --  PVRI: --, ABG - ( 09 Nov 2017 03:03 )  pH: 7.44  /  pCO2: 35    /  pO2: 88    / HCO3: 23    / Base Excess: -0.2  /  SaO2: 97                  Intake and output was reviewed and the fluid balance was calculated  Daily     Daily   I&O's Summary    09 Nov 2017 07:01  -  10 Nov 2017 07:00  --------------------------------------------------------  IN: 1983 mL / OUT: 2810 mL / NET: -827 mL    10 Nov 2017 07:01  -  10 Nov 2017 14:25  --------------------------------------------------------  IN: 0 mL / OUT: 1000 mL / NET: -1000 mL        All lines and drain sites were assessed  Glycemic trend was reviewedCAPILLARY BLOOD GLUCOSE  117 (10 Nov 2017 06:00)      POCT Blood Glucose.: 102 mg/dL (10 Nov 2017 12:06)    No acute change in mental status  Auscultation of the chest reveals equal bs  Abdomen is soft  Extremities are warm and well perfused  Wounds appear clean and unremarkable  Antibiotics are periop    labs  CBC Full  -  ( 10 Nov 2017 03:11 )  WBC Count : 10.7 K/uL  Hemoglobin : 9.2 g/dL  Hematocrit : 27.3 %  Platelet Count - Automated : 156 K/uL  Mean Cell Volume : 84.5 fL  Mean Cell Hemoglobin : 28.5 pg  Mean Cell Hemoglobin Concentration : 33.7 g/dL  Auto Neutrophil # : x  Auto Lymphocyte # : x  Auto Monocyte # : x  Auto Eosinophil # : x  Auto Basophil # : x  Auto Neutrophil % : x  Auto Lymphocyte % : x  Auto Monocyte % : x  Auto Eosinophil % : x  Auto Basophil % : x    11-10    137  |  101  |  12  ----------------------------<  117<H>  4.4   |  25  |  0.89    Ca    8.8      10 Nov 2017 03:11  Phos  2.9     11-10  Mg     2.3     11-10    TPro  5.8<L>  /  Alb  3.4  /  TBili  0.4  /  DBili  x   /  AST  39  /  ALT  17  /  AlkPhos  34<L>  11-10    PT/INR - ( 10 Nov 2017 03:11 )   PT: 15.8 sec;   INR: 1.41          PTT - ( 10 Nov 2017 03:11 )  PTT:31.9 sec  The current medications were reviewed   MEDICATIONS  (STANDING):  aspirin enteric coated 81 milliGRAM(s) Oral daily  docusate sodium 100 milliGRAM(s) Oral three times a day  famotidine    Tablet 20 milliGRAM(s) Oral two times a day  heparin  Injectable 5000 Unit(s) SubCutaneous every 8 hours  insulin lispro (HumaLOG) corrective regimen sliding scale   SubCutaneous Before meals and at bedtime  metoprolol     tartrate 12.5 milliGRAM(s) Oral every 12 hours  senna 2 Tablet(s) Oral at bedtime  sodium chloride 0.45%. 1000 milliLiter(s) (10 mL/Hr) IV Continuous <Continuous>  sodium chloride 0.9% lock flush 3 milliLiter(s) IV Push every 8 hours    MEDICATIONS  (PRN):  acetaminophen 300 mG/codeine 30 mG 1 Tablet(s) Oral every 4 hours PRN Mild Pain (1 - 3)  acetaminophen 300 mG/codeine 30 mG 2 Tablet(s) Oral every 4 hours PRN Moderate Pain (4 - 6)  polyethylene glycol 3350 17 Gram(s) Oral daily PRN Constipation       PROBLEM LIST/ ASSESSMENT:  HEALTH ISSUES - PROBLEM Dx:      ,   Patient is a 56y old  Male who presents with a chief complaint of Severe AS (10 Nov 2017 11:50)     s/p avr  acute changes include acute respiratory failure    My plan includes :  close hemodynamic, ventilatory and drain monitoring and management per post op routine    Monitor for arrhythmias and monitor parameters for organ perfusion  monitor neurologic status  Head of the bed should remain elevated to 45 deg .   chest PT and IS will be encouraged  monitor adequacy of oxygenation and ventilation and attempt to wean oxygen  Nutritional goals will be met using po eventually , ensure adequate caloric intake and montior the same  Stress ulcer and VTE prophylaxis will be achieved    Glycemic control is satisfactory  Electrolytes have been repleted as necessary and wound care has been carried out. Pain control has been achieved.   agressive physical therapy and early mobility and ambulation goals will be met   The family was updated about the course and plan  CRITICAL CARE TIME SPENT in evaluation and management, reassessments, review and interpretation of labs and x-rays, ventilator and hemodynamic management, formulating a plan and coordinating care: ___90____ MIN.  Time does not include procedural time.  CTICU ATTENDING     					    Daniel Kasper MD

## 2017-11-10 ENCOUNTER — TRANSCRIPTION ENCOUNTER (OUTPATIENT)
Age: 57
End: 2017-11-10

## 2017-11-10 LAB
ALBUMIN SERPL ELPH-MCNC: 3.4 G/DL — SIGNIFICANT CHANGE UP (ref 3.3–5)
ALP SERPL-CCNC: 34 U/L — LOW (ref 40–120)
ALT FLD-CCNC: 17 U/L — SIGNIFICANT CHANGE UP (ref 10–45)
ANION GAP SERPL CALC-SCNC: 11 MMOL/L — SIGNIFICANT CHANGE UP (ref 5–17)
APTT BLD: 31.9 SEC — SIGNIFICANT CHANGE UP (ref 27.5–37.4)
AST SERPL-CCNC: 39 U/L — SIGNIFICANT CHANGE UP (ref 10–40)
BILIRUB SERPL-MCNC: 0.4 MG/DL — SIGNIFICANT CHANGE UP (ref 0.2–1.2)
BUN SERPL-MCNC: 12 MG/DL — SIGNIFICANT CHANGE UP (ref 7–23)
CALCIUM SERPL-MCNC: 8.8 MG/DL — SIGNIFICANT CHANGE UP (ref 8.4–10.5)
CHLORIDE SERPL-SCNC: 101 MMOL/L — SIGNIFICANT CHANGE UP (ref 96–108)
CO2 SERPL-SCNC: 25 MMOL/L — SIGNIFICANT CHANGE UP (ref 22–31)
CREAT SERPL-MCNC: 0.89 MG/DL — SIGNIFICANT CHANGE UP (ref 0.5–1.3)
GLUCOSE BLDC GLUCOMTR-MCNC: 102 MG/DL — HIGH (ref 70–99)
GLUCOSE BLDC GLUCOMTR-MCNC: 110 MG/DL — HIGH (ref 70–99)
GLUCOSE BLDC GLUCOMTR-MCNC: 135 MG/DL — HIGH (ref 70–99)
GLUCOSE SERPL-MCNC: 117 MG/DL — HIGH (ref 70–99)
HCT VFR BLD CALC: 27.3 % — LOW (ref 39–50)
HGB BLD-MCNC: 9.2 G/DL — LOW (ref 13–17)
INR BLD: 1.41 — HIGH (ref 0.88–1.16)
MAGNESIUM SERPL-MCNC: 2.3 MG/DL — SIGNIFICANT CHANGE UP (ref 1.6–2.6)
MCHC RBC-ENTMCNC: 28.5 PG — SIGNIFICANT CHANGE UP (ref 27–34)
MCHC RBC-ENTMCNC: 33.7 G/DL — SIGNIFICANT CHANGE UP (ref 32–36)
MCV RBC AUTO: 84.5 FL — SIGNIFICANT CHANGE UP (ref 80–100)
PHOSPHATE SERPL-MCNC: 2.9 MG/DL — SIGNIFICANT CHANGE UP (ref 2.5–4.5)
PLATELET # BLD AUTO: 156 K/UL — SIGNIFICANT CHANGE UP (ref 150–400)
POTASSIUM SERPL-MCNC: 4.4 MMOL/L — SIGNIFICANT CHANGE UP (ref 3.5–5.3)
POTASSIUM SERPL-SCNC: 4.4 MMOL/L — SIGNIFICANT CHANGE UP (ref 3.5–5.3)
PROT SERPL-MCNC: 5.8 G/DL — LOW (ref 6–8.3)
PROTHROM AB SERPL-ACNC: 15.8 SEC — HIGH (ref 9.8–12.7)
RBC # BLD: 3.23 M/UL — LOW (ref 4.2–5.8)
RBC # FLD: 11.9 % — SIGNIFICANT CHANGE UP (ref 10.3–16.9)
SODIUM SERPL-SCNC: 137 MMOL/L — SIGNIFICANT CHANGE UP (ref 135–145)
WBC # BLD: 10.7 K/UL — HIGH (ref 3.8–10.5)
WBC # FLD AUTO: 10.7 K/UL — HIGH (ref 3.8–10.5)

## 2017-11-10 PROCEDURE — 71010: CPT | Mod: 26

## 2017-11-10 PROCEDURE — 99233 SBSQ HOSP IP/OBS HIGH 50: CPT

## 2017-11-10 PROCEDURE — 93010 ELECTROCARDIOGRAM REPORT: CPT

## 2017-11-10 RX ORDER — KETOROLAC TROMETHAMINE 30 MG/ML
15 SYRINGE (ML) INJECTION EVERY 6 HOURS
Qty: 0 | Refills: 0 | Status: DISCONTINUED | OUTPATIENT
Start: 2017-11-10 | End: 2017-11-11

## 2017-11-10 RX ORDER — METOPROLOL TARTRATE 50 MG
25 TABLET ORAL EVERY 6 HOURS
Qty: 0 | Refills: 0 | Status: DISCONTINUED | OUTPATIENT
Start: 2017-11-10 | End: 2017-11-11

## 2017-11-10 RX ORDER — FUROSEMIDE 40 MG
20 TABLET ORAL DAILY
Qty: 0 | Refills: 0 | Status: DISCONTINUED | OUTPATIENT
Start: 2017-11-10 | End: 2017-11-11

## 2017-11-10 RX ORDER — SODIUM CHLORIDE 9 MG/ML
3 INJECTION INTRAMUSCULAR; INTRAVENOUS; SUBCUTANEOUS EVERY 8 HOURS
Qty: 0 | Refills: 0 | Status: DISCONTINUED | OUTPATIENT
Start: 2017-11-10 | End: 2017-11-11

## 2017-11-10 RX ORDER — POTASSIUM CHLORIDE 20 MEQ
10 PACKET (EA) ORAL DAILY
Qty: 0 | Refills: 0 | Status: DISCONTINUED | OUTPATIENT
Start: 2017-11-10 | End: 2017-11-11

## 2017-11-10 RX ADMIN — Medication 15 MILLIGRAM(S): at 05:00

## 2017-11-10 RX ADMIN — Medication 100 MILLIGRAM(S): at 15:50

## 2017-11-10 RX ADMIN — Medication 2 TABLET(S): at 23:00

## 2017-11-10 RX ADMIN — Medication 15 MILLIGRAM(S): at 06:00

## 2017-11-10 RX ADMIN — Medication 12.5 MILLIGRAM(S): at 05:05

## 2017-11-10 RX ADMIN — Medication 20 MILLIGRAM(S): at 18:37

## 2017-11-10 RX ADMIN — Medication 10 MILLIEQUIVALENT(S): at 18:36

## 2017-11-10 RX ADMIN — Medication 100 MILLIGRAM(S): at 00:30

## 2017-11-10 RX ADMIN — SODIUM CHLORIDE 3 MILLILITER(S): 9 INJECTION INTRAMUSCULAR; INTRAVENOUS; SUBCUTANEOUS at 15:49

## 2017-11-10 RX ADMIN — Medication 1 TABLET(S): at 00:28

## 2017-11-10 RX ADMIN — HEPARIN SODIUM 5000 UNIT(S): 5000 INJECTION INTRAVENOUS; SUBCUTANEOUS at 15:50

## 2017-11-10 RX ADMIN — Medication 100 MILLIGRAM(S): at 22:10

## 2017-11-10 RX ADMIN — HEPARIN SODIUM 5000 UNIT(S): 5000 INJECTION INTRAVENOUS; SUBCUTANEOUS at 22:10

## 2017-11-10 RX ADMIN — Medication 2 TABLET(S): at 22:10

## 2017-11-10 RX ADMIN — SODIUM CHLORIDE 3 MILLILITER(S): 9 INJECTION INTRAMUSCULAR; INTRAVENOUS; SUBCUTANEOUS at 21:52

## 2017-11-10 RX ADMIN — Medication 15 MILLIGRAM(S): at 15:49

## 2017-11-10 RX ADMIN — FAMOTIDINE 20 MILLIGRAM(S): 10 INJECTION INTRAVENOUS at 05:04

## 2017-11-10 RX ADMIN — Medication 81 MILLIGRAM(S): at 12:09

## 2017-11-10 RX ADMIN — HEPARIN SODIUM 5000 UNIT(S): 5000 INJECTION INTRAVENOUS; SUBCUTANEOUS at 05:05

## 2017-11-10 RX ADMIN — Medication 100 MILLIGRAM(S): at 05:05

## 2017-11-10 RX ADMIN — SENNA PLUS 2 TABLET(S): 8.6 TABLET ORAL at 22:10

## 2017-11-10 RX ADMIN — Medication 25 MILLIGRAM(S): at 17:24

## 2017-11-10 RX ADMIN — FAMOTIDINE 20 MILLIGRAM(S): 10 INJECTION INTRAVENOUS at 17:26

## 2017-11-10 RX ADMIN — Medication 15 MILLIGRAM(S): at 16:30

## 2017-11-10 NOTE — DIETITIAN INITIAL EVALUATION ADULT. - NS AS NUTRI INTERV ED CONTENT
Purpose of the nutrition education/Survival information/Nutrition relationship to health/disease/Dash/TLC diet/Priority modifications/Recommended modifications

## 2017-11-10 NOTE — DISCHARGE NOTE ADULT - HOSPITAL COURSE
7yo male with PMHx significant for severe Aortic Stenosis (with MAYRA 0.6cm2) with bicuspid valve and childhood asthma who presented for routine checkup for evaluation of bicuspid valve with aortic stenosis.  Patient reported that he was found to have murmur on physical exam in 2012 and since has undergone Surveillance Echocardiograms.  Most recent Echocardiogram 2/24/2017 revealing EF 60%, mild LA enlargement, bicuspid valve with moderate to severe AS, mitral valve normal, tricuspid valve normal, pulmonic valve normal, PAS 28mmHg, mean gradient 40mmHg, peak 75mmHg, MAYRA 1.13cm2. Patient has been asymptomatic; very active engaging in cycling classes 5 times a week. Denies chest pain, dizziness, palpitations, syncope, fatigue, shortness of breath, LE edema, PND, or orthopnea.  Patient subsequently had TEE10/22/2017 that revealed a calcified bicuspid aortic valve with decreased opening, right and non-coronary cusps fused and a calcified midline raphe is visualized, peak transaortic valve gradient ~89mmHg, mean transaortic valve gradient, ~MAYRA 0.6cm2 c/w severe aortic stenosis, mild to moderate AI, mitral annular calcification, otherwise normal MV, mild MR, normal aortic root, aortic arch, and descending thoracic aorta, normal LA with no LA appendage thrombus, normal LV systolic with EF 66%, no segmental WMA.  OR Course: 11/08/2017: Uncomplacated OR. No blood, low dose Levophed. Extubated @ 6: 30pm. 11/09/2017: Run of AF; amiodarone bolus x 1; otherwise uneventful; no pressors; (-)600mL. 11/09/2017: Uneventful, low uo responded well to albumin. Pleural / SS drains out. 11/10/2017: 9L.        Patient was referred to Dr. Hoffman for surgical consultation who recommended that patient would benefit from and is a candidate for aortic valve replacement with bioprosthesis.  He now presents for cardiac catheterization to assess coronaries prior to scheduled AVR 11/7/17. 5yo male with PMHx significant for severe Aortic Stenosis (with MAYRA 0.6cm2) with bicuspid valve and childhood asthma who presented for routine checkup for evaluation of bicuspid valve with aortic stenosis.  He was referred to Dr. Barahona for severe Aortic stenosis. On 11/6 he was admitted for preoperative evaluation. Cardiac catheterization revealed non obstructive CAD. CT chest showed left lower lobe pleural base nodular density of lung. Dr. Trotter from thoracic surgery service was consulted.     On 11/08/2017 he underwent Aortic valve replacement with Dr. Barahona and subsequent LVATS, LLL wedge resection with Dr. Trotter. His operative course was uncomplicated. He arrived to CTICU on low dose Levophed, hemodynamically stable. He was extubated without complication on POD #0. On POD #1 he had a brief run of PAF. He was given an Amiodarone bolus and started on beta blockade. He remained in NSR. He transferred to step down unit on postoperative day 2. He ambulated well independently. He made steady progress and was discharged home on POD #3 55yo male with PMHx significant for severe Aortic Stenosis (with MAYRA 0.6cm2) with bicuspid valve and childhood asthma who presented for routine checkup for evaluation of bicuspid valve with aortic stenosis.  He was referred to Dr. Barahona for severe Aortic stenosis. On 11/6 he was admitted for preoperative evaluation. Cardiac catheterization revealed non obstructive CAD. CT chest showed left lower lobe pleural base nodular density of lung. Dr. Trotter from thoracic surgery service was consulted.     On 11/08/2017 he underwent Aortic valve replacement with Dr. Barahona and subsequent LVATS, LLL wedge resection with Dr. Trotter. His operative course was uncomplicated. He arrived to CTICU on low dose Levophed, hemodynamically stable. He was extubated without complication on POD #0. On POD #1 he had a brief run of PAF. He was given an Amiodarone bolus and started on beta blockade. He remained in NSR. He transferred to step down unit on postoperative day 2. He ambulated well independently. He made steady progress and was discharged home on POD #3

## 2017-11-10 NOTE — DISCHARGE NOTE ADULT - PATIENT PORTAL LINK FT
“You can access the [facility] Patient Portal, offered by [facility], by registering with the following website: [email or web address]”

## 2017-11-10 NOTE — DISCHARGE NOTE ADULT - MEDICATION SUMMARY - MEDICATIONS TO TAKE
I will START or STAY ON the medications listed below when I get home from the hospital:    aspirin 81 mg oral tablet  -- 1 tab(s) by mouth once a day  -- Indication: For Heart    acetaminophen-codeine 300 mg-30 mg oral tablet  -- 1 tab(s) by mouth every 6 hours, As Needed -Mild Pain (1 - 3) MDD:4  -- Indication: For Pain    metoprolol tartrate 50 mg oral tablet  -- 1 tab(s) by mouth 2 times a day  -- Indication: For Blood pressure    furosemide 20 mg oral tablet  -- 1 tab(s) by mouth once a day  -- Indication: For Diuretic    potassium chloride 10 mEq oral tablet, extended release  -- 1 tab(s) by mouth once a day  -- Indication: For Supplement

## 2017-11-10 NOTE — DISCHARGE NOTE ADULT - CARE PLAN
Goal:	Full Recovery  Instructions for follow-up, activity and diet:	-Please follow up with Dr. Barahona and Dr. Trotter in 1-2 weeks, please call the office on Monday to schedule an appointment.  The office is located at [facility], [facility], 4th floor. Call us with any questions #[phone].    -Follow up with your cardiologist, Dr. Selena Dey, call [phone] for an appointment.   -Walk daily as tolerated and use your incentive spirometer every hour.    -No driving or strenuous activity/exercise for 6 weeks, or until cleared by your surgeon.    -Gently clean your incisions with anti-bacterial soap and water, pat dry.  You may leave them open to air.    -Call your doctor if you have shortness of breath, chest pain not relieved by pain medication, dizziness, fever >101.5, or increased redness or drainage from incisions.

## 2017-11-10 NOTE — PROGRESS NOTE ADULT - ASSESSMENT
56y M with history of severe AS (MAYRA 0.6cm2, bicuspid valve) with frequent surveillance with outpatient cardiologist referred to Dr. Barahona for surgical evaluation after EZ on 10/22/17 revealed bicuspid AV with increasing stenosis and EF 65%. He was admitted to [facility] on 11/6/17 under the care of Dr. Barahona where he completed pre-surgical evaluation and underwent uncomplicated AVR on 11/6/17.  He arrived to CTICU in stable condition on low dose levo which was weaned on POD 1.  Extubated on POD 0.  He developed a brief run of afib on POD 1 which was converted to NSR after amio bolus, which he has remained in since.  Pleural and mediastinal drains removed without incident, he remained HD stable overnight and was transferred to floor care early POD 2.       Neurovascular: No delirium, pain well controlled on current regimen.  -C/w PRNs for pain control    Respiratory: Saturating well on RA  -CXR in AM  -Encourage IS 10x/hour while awake; C+DB; Ambulation  -Monitor SpO2 for respiratory status    Cardiovascular: HD Stable, HR controlled. POD 2 s/p above procedure, EF 65%  -C/w BB, titrated to 25q6h today, continue to titrate as tolerated.  ASA 81mg  -Lasix 20mg QD started with K+  -Monitor HR/BP/Tele    GI: Stable, tolerating PO  -GI PPX: Pepcid   -Colace, senna, and miralax for bowel regimen.     /Renal: BUN/Cr: 12/0.89; No urinary issues  -Vargas out, +TOV  -Trend AM Cr  -Monitor I/O    ID: Afebrile, Asymptomatic  -WCC 10.7, continue to trend.   -No acute issues at this time, continue to monitor for SIRS    Endo: A1C: 4.8; TSH: 1.064  -C/w ISS for glycemic control  -Trend FS    Heme: H/H Stable  -DVT PPX: HSQ 5000 q8h and SCDs  -CBC, chem in AM    Dispo: Home when medically ready.

## 2017-11-10 NOTE — PROGRESS NOTE ADULT - SUBJECTIVE AND OBJECTIVE BOX
Patient discussed on morning rounds with Dr. Barahona     Operation / Date: AVR with L VATS LLL wedge resection and en bloc resection of pleural mass with Dr. Trotter on 11/8/17    SUBJECTIVE ASSESSMENT:  56y Male seen at bedside this AM. He states that he has some incisional and post-tussive pain but is otherwise without complaints and feels as though the pain medications are helping.  He denies acute overnight events as well has post-op BM but endorses flatulence with no abdominal pain.  Denies HA, AMS, CP, palpitations, SOB, n/v/d, fever.       Vital Signs Last 24 Hrs  T(C): 36.6 (10 Nov 2017 14:30), Max: 36.9 (09 Nov 2017 21:05)  T(F): 97.9 (10 Nov 2017 14:30), Max: 98.4 (09 Nov 2017 21:05)  HR: 90 (10 Nov 2017 11:36) (74 - 90)  BP: 138/71 (10 Nov 2017 11:36) (112/56 - 149/62)  BP(mean): 93 (10 Nov 2017 11:36) (69 - 94)  RR: 14 (10 Nov 2017 11:36) (14 - 26)  SpO2: 99% (10 Nov 2017 11:36) (94% - 99%)  I&O's Detail    09 Nov 2017 07:01  -  10 Nov 2017 07:00  --------------------------------------------------------  IN:    Albumin 5%  - 250 mL: 500 mL    insulin Infusion: 3 mL    IV PiggyBack: 500 mL    Oral Fluid: 750 mL    sodium chloride 0.45%.: 230 mL  Total IN: 1983 mL    OUT:    Chest Tube: 15 mL    Chest Tube: 65 mL    Indwelling Catheter - Urethral: 2730 mL  Total OUT: 2810 mL    Total NET: -827 mL      10 Nov 2017 07:01  -  10 Nov 2017 17:14  --------------------------------------------------------  IN:  Total IN: 0 mL    OUT:    Voided: 1000 mL  Total OUT: 1000 mL    Total NET: -1000 mL    CHEST TUBE:  No.   TRACEE DRAIN: No.  EPICARDIAL WIRES: Yes  TIE DOWNS: Yes  HORTON: No.    PHYSICAL EXAM:    General: OOB to chair, no acute distress.     Neurological: AAOx3, no AMS or focal deficits.     Cardiovascular: RRR, S1/S2, no m/r/g.     Respiratory: No acute distress, CTA b/l, no w/r/r.     Gastrointestinal: ND, NBS, non-TTP.    Extremities: Warm and well perfused, no calf ttp b/l.  No edema b/l.     Vascular: Pulses 2+ throughout.     Incision sites: MSI: C/D/I, no click.  VATS incisions: C/D/I    LABS:                        9.2    10.7  )-----------( 156      ( 10 Nov 2017 03:11 )             27.3       COUMADIN:  No.   PT/INR - ( 10 Nov 2017 03:11 )   PT: 15.8 sec;   INR: 1.41          PTT - ( 10 Nov 2017 03:11 )  PTT:31.9 sec    11-10    137  |  101  |  12  ----------------------------<  117<H>  4.4   |  25  |  0.89    Ca    8.8      10 Nov 2017 03:11  Phos  2.9     11-10  Mg     2.3     11-10    TPro  5.8<L>  /  Alb  3.4  /  TBili  0.4  /  DBili  x   /  AST  39  /  ALT  17  /  AlkPhos  34<L>  11-10    MEDICATIONS  (STANDING):  aspirin enteric coated 81 milliGRAM(s) Oral daily  docusate sodium 100 milliGRAM(s) Oral three times a day  famotidine    Tablet 20 milliGRAM(s) Oral two times a day  heparin  Injectable 5000 Unit(s) SubCutaneous every 8 hours  insulin lispro (HumaLOG) corrective regimen sliding scale   SubCutaneous Before meals and at bedtime  metoprolol     tartrate 25 milliGRAM(s) Oral every 6 hours  senna 2 Tablet(s) Oral at bedtime  sodium chloride 0.45%. 1000 milliLiter(s) (10 mL/Hr) IV Continuous <Continuous>  sodium chloride 0.9% lock flush 3 milliLiter(s) IV Push every 8 hours    MEDICATIONS  (PRN):  acetaminophen 300 mG/codeine 30 mG 1 Tablet(s) Oral every 4 hours PRN Mild Pain (1 - 3)  acetaminophen 300 mG/codeine 30 mG 2 Tablet(s) Oral every 4 hours PRN Moderate Pain (4 - 6)  ketorolac   Injectable 15 milliGRAM(s) IV Push every 6 hours PRN Moderate Pain (4 - 6)  polyethylene glycol 3350 17 Gram(s) Oral daily PRN Constipation    RADIOLOGY & ADDITIONAL TESTS:  < from: Xray Chest 1 View AP -PORTABLE-Routine (11.10.17 @ 01:13) >    EXAM:  XR CHEST 1 VIEW PORT ROUTINE                          PROCEDURE DATE:  11/10/2017         INTERPRETATION:    PORTABLE CHEST X-RAY    Indication: Follow Up.    Bedside examination of the chest dated 11/10/2017 1:13 AM is compared to   the previous study of 11/9/2017.    Findings: Again status post valve replacement surgery. Right-sided   central line remains in place. Small bibasilar atelectasis. No pleural   effusion.    Impression: Change small bibasilar atelectasis.    < end of copied text >

## 2017-11-10 NOTE — PROGRESS NOTE ADULT - SUBJECTIVE AND OBJECTIVE BOX
CTICU  CRITICAL  CARE  attending     Hand off received 					   Pertinent clinical, laboratory, radiographic, hemodynamic, echocardiographic, respiratory data, microbiologic data and chart were reviewed and analyzed frequently throughout the course of the day and night  Patient seen and examined with CTS/ SH attending at bedside  Pt is a 56y , Male, HEALTH ISSUES - PROBLEM Dx:      , FAMILY HISTORY:  Family history of esophageal cancer (Father)  Family history of heart attack (Father)  PAST MEDICAL & SURGICAL HISTORY:  Aortic stenosis due to bicuspid aortic valve  Aortic stenosis, severe  History of kidney stones    Patient is a 56y old  Male who presents with a chief complaint of Severe AS (10 Nov 2017 11:50)      14 system review was unremarkable  acute changes include acute respiratory failure  Vital signs, hemodynamic and respiratory parameters were reviewed from the bedside nursing flowsheet.  ICU Vital Signs Last 24 Hrs  T(C): 36.6 (10 Nov 2017 14:30), Max: 36.9 (09 Nov 2017 21:05)  T(F): 97.9 (10 Nov 2017 14:30), Max: 98.4 (09 Nov 2017 21:05)  HR: 90 (10 Nov 2017 11:36) (74 - 90)  BP: 138/71 (10 Nov 2017 11:36) (106/55 - 149/62)  BP(mean): 93 (10 Nov 2017 11:36) (69 - 94)  ABP: --  ABP(mean): --  RR: 14 (10 Nov 2017 11:36) (14 - 29)  SpO2: 99% (10 Nov 2017 11:36) (94% - 99%)    Adult Advanced Hemodynamics Last 24 Hrs  CVP(mm Hg): --  CVP(cm H2O): --  CO: --  CI: --  PA: --  PA(mean): --  PCWP: --  SVR: --  SVRI: --  PVR: --  PVRI: --, ABG - ( 09 Nov 2017 03:03 )  pH: 7.44  /  pCO2: 35    /  pO2: 88    / HCO3: 23    / Base Excess: -0.2  /  SaO2: 97                  Intake and output was reviewed and the fluid balance was calculated  Daily     Daily   I&O's Summary    09 Nov 2017 07:01  -  10 Nov 2017 07:00  --------------------------------------------------------  IN: 1983 mL / OUT: 2810 mL / NET: -827 mL    10 Nov 2017 07:01  -  10 Nov 2017 14:35  --------------------------------------------------------  IN: 0 mL / OUT: 1000 mL / NET: -1000 mL        All lines and drain sites were assessed  Glycemic trend was reviewedCAPILLARY BLOOD GLUCOSE  117 (10 Nov 2017 06:00)      POCT Blood Glucose.: 102 mg/dL (10 Nov 2017 12:06)    No acute change in mental status  Auscultation of the chest reveals equal bs  Abdomen is soft  Extremities are warm and well perfused  Wounds appear clean and unremarkable  Antibiotics are periop    labs  CBC Full  -  ( 10 Nov 2017 03:11 )  WBC Count : 10.7 K/uL  Hemoglobin : 9.2 g/dL  Hematocrit : 27.3 %  Platelet Count - Automated : 156 K/uL  Mean Cell Volume : 84.5 fL  Mean Cell Hemoglobin : 28.5 pg  Mean Cell Hemoglobin Concentration : 33.7 g/dL  Auto Neutrophil # : x  Auto Lymphocyte # : x  Auto Monocyte # : x  Auto Eosinophil # : x  Auto Basophil # : x  Auto Neutrophil % : x  Auto Lymphocyte % : x  Auto Monocyte % : x  Auto Eosinophil % : x  Auto Basophil % : x    11-10    137  |  101  |  12  ----------------------------<  117<H>  4.4   |  25  |  0.89    Ca    8.8      10 Nov 2017 03:11  Phos  2.9     11-10  Mg     2.3     11-10    TPro  5.8<L>  /  Alb  3.4  /  TBili  0.4  /  DBili  x   /  AST  39  /  ALT  17  /  AlkPhos  34<L>  11-10    PT/INR - ( 10 Nov 2017 03:11 )   PT: 15.8 sec;   INR: 1.41          PTT - ( 10 Nov 2017 03:11 )  PTT:31.9 sec  The current medications were reviewed   MEDICATIONS  (STANDING):  aspirin enteric coated 81 milliGRAM(s) Oral daily  docusate sodium 100 milliGRAM(s) Oral three times a day  famotidine    Tablet 20 milliGRAM(s) Oral two times a day  heparin  Injectable 5000 Unit(s) SubCutaneous every 8 hours  insulin lispro (HumaLOG) corrective regimen sliding scale   SubCutaneous Before meals and at bedtime  metoprolol     tartrate 12.5 milliGRAM(s) Oral every 12 hours  senna 2 Tablet(s) Oral at bedtime  sodium chloride 0.45%. 1000 milliLiter(s) (10 mL/Hr) IV Continuous <Continuous>  sodium chloride 0.9% lock flush 3 milliLiter(s) IV Push every 8 hours    MEDICATIONS  (PRN):  acetaminophen 300 mG/codeine 30 mG 1 Tablet(s) Oral every 4 hours PRN Mild Pain (1 - 3)  acetaminophen 300 mG/codeine 30 mG 2 Tablet(s) Oral every 4 hours PRN Moderate Pain (4 - 6)  polyethylene glycol 3350 17 Gram(s) Oral daily PRN Constipation       PROBLEM LIST/ ASSESSMENT:  HEALTH ISSUES - PROBLEM Dx:      ,   Patient is a 56y old  Male who presents with a chief complaint of Severe AS (10 Nov 2017 11:50)     s/p avr      My plan includes :  close hemodynamic, ventilatory and drain monitoring and management per post op routine    Monitor for arrhythmias and monitor parameters for organ perfusion  monitor neurologic status  Head of the bed should remain elevated to 45 deg .   chest PT and IS will be encouraged  monitor adequacy of oxygenation and ventilation and attempt to wean oxygen  Nutritional goals will be met using po eventually , ensure adequate caloric intake and montior the same  Stress ulcer and VTE prophylaxis will be achieved    Glycemic control is satisfactory  Electrolytes have been repleted as necessary and wound care has been carried out. Pain control has been achieved.   agressive physical therapy and early mobility and ambulation goals will be met   The family was updated about the course and plan  CRITICAL CARE TIME SPENT in evaluation and management, reassessments, review and interpretation of labs and x-rays, ventilator and hemodynamic management, formulating a plan and coordinating care: ___90____ MIN.  Time does not include procedural time.  CTICU ATTENDING     					    Daniel Kasper MD

## 2017-11-10 NOTE — DIETITIAN INITIAL EVALUATION ADULT. - OTHER INFO
57y/o M AVR/ Left VATS LLL wedge resection with en bloc resection of pleural mass. Pt seen sitting OOB in chair. He reports a decreased appetite post-op; observed 25% of breakfast tray consumed. Denies N/V/D/C, pain, and mechanical issues with po intake. Pt expressed a strong interest in receiving diet education and making diet/lifestyle changes. PTA he reports eating a lot of processed foods and not portioning his meals. He endorses ~22# wt gain over the past year because of this. RD provide verbal and written heart healthy diet education; pt voiced understanding and asking questions. Will follow per policy.

## 2017-11-10 NOTE — DISCHARGE NOTE ADULT - PLAN OF CARE
Full Recovery -Please follow up with Dr. Barahona and Dr. Trotter in 1-2 weeks, please call the office on Monday to schedule an appointment.  The office is located at [facility], [facility], 4th floor. Call us with any questions #[phone].    -Follow up with your cardiologist, Dr. Selena Dey, call [phone] for an appointment.   -Walk daily as tolerated and use your incentive spirometer every hour.    -No driving or strenuous activity/exercise for 6 weeks, or until cleared by your surgeon.    -Gently clean your incisions with anti-bacterial soap and water, pat dry.  You may leave them open to air.    -Call your doctor if you have shortness of breath, chest pain not relieved by pain medication, dizziness, fever >101.5, or increased redness or drainage from incisions.

## 2017-11-10 NOTE — DIETITIAN INITIAL EVALUATION ADULT. - ENERGY NEEDS
IBW 80.9Kg  %%  BMI 27.2    Utilized ABW to calculate needs, pt falls within % of IBW. Adjusted for post-op.

## 2017-11-11 VITALS — TEMPERATURE: 98 F

## 2017-11-11 LAB
ALBUMIN SERPL ELPH-MCNC: 3.6 G/DL — SIGNIFICANT CHANGE UP (ref 3.3–5)
ALP SERPL-CCNC: 52 U/L — SIGNIFICANT CHANGE UP (ref 40–120)
ALT FLD-CCNC: 20 U/L — SIGNIFICANT CHANGE UP (ref 10–45)
ANION GAP SERPL CALC-SCNC: 11 MMOL/L — SIGNIFICANT CHANGE UP (ref 5–17)
APTT BLD: 28.3 SEC — SIGNIFICANT CHANGE UP (ref 27.5–37.4)
AST SERPL-CCNC: 32 U/L — SIGNIFICANT CHANGE UP (ref 10–40)
BILIRUB SERPL-MCNC: 0.4 MG/DL — SIGNIFICANT CHANGE UP (ref 0.2–1.2)
BUN SERPL-MCNC: 13 MG/DL — SIGNIFICANT CHANGE UP (ref 7–23)
CALCIUM SERPL-MCNC: 9.5 MG/DL — SIGNIFICANT CHANGE UP (ref 8.4–10.5)
CHLORIDE SERPL-SCNC: 100 MMOL/L — SIGNIFICANT CHANGE UP (ref 96–108)
CO2 SERPL-SCNC: 27 MMOL/L — SIGNIFICANT CHANGE UP (ref 22–31)
CREAT SERPL-MCNC: 0.85 MG/DL — SIGNIFICANT CHANGE UP (ref 0.5–1.3)
GLUCOSE BLDC GLUCOMTR-MCNC: 104 MG/DL — HIGH (ref 70–99)
GLUCOSE BLDC GLUCOMTR-MCNC: 98 MG/DL — SIGNIFICANT CHANGE UP (ref 70–99)
GLUCOSE SERPL-MCNC: 98 MG/DL — SIGNIFICANT CHANGE UP (ref 70–99)
HCT VFR BLD CALC: 30 % — LOW (ref 39–50)
HGB BLD-MCNC: 10.2 G/DL — LOW (ref 13–17)
INR BLD: 1.12 — SIGNIFICANT CHANGE UP (ref 0.88–1.16)
MAGNESIUM SERPL-MCNC: 2.2 MG/DL — SIGNIFICANT CHANGE UP (ref 1.6–2.6)
MCHC RBC-ENTMCNC: 28.5 PG — SIGNIFICANT CHANGE UP (ref 27–34)
MCHC RBC-ENTMCNC: 34 G/DL — SIGNIFICANT CHANGE UP (ref 32–36)
MCV RBC AUTO: 83.8 FL — SIGNIFICANT CHANGE UP (ref 80–100)
PHOSPHATE SERPL-MCNC: 3.2 MG/DL — SIGNIFICANT CHANGE UP (ref 2.5–4.5)
PLATELET # BLD AUTO: 199 K/UL — SIGNIFICANT CHANGE UP (ref 150–400)
POTASSIUM SERPL-MCNC: 4.3 MMOL/L — SIGNIFICANT CHANGE UP (ref 3.5–5.3)
POTASSIUM SERPL-SCNC: 4.3 MMOL/L — SIGNIFICANT CHANGE UP (ref 3.5–5.3)
PROT SERPL-MCNC: 6.6 G/DL — SIGNIFICANT CHANGE UP (ref 6–8.3)
PROTHROM AB SERPL-ACNC: 12.5 SEC — SIGNIFICANT CHANGE UP (ref 9.8–12.7)
RBC # BLD: 3.58 M/UL — LOW (ref 4.2–5.8)
RBC # FLD: 12.2 % — SIGNIFICANT CHANGE UP (ref 10.3–16.9)
SODIUM SERPL-SCNC: 138 MMOL/L — SIGNIFICANT CHANGE UP (ref 135–145)
WBC # BLD: 8.6 K/UL — SIGNIFICANT CHANGE UP (ref 3.8–10.5)
WBC # FLD AUTO: 8.6 K/UL — SIGNIFICANT CHANGE UP (ref 3.8–10.5)

## 2017-11-11 PROCEDURE — 85610 PROTHROMBIN TIME: CPT

## 2017-11-11 PROCEDURE — 84132 ASSAY OF SERUM POTASSIUM: CPT

## 2017-11-11 PROCEDURE — 82550 ASSAY OF CK (CPK): CPT

## 2017-11-11 PROCEDURE — 82330 ASSAY OF CALCIUM: CPT

## 2017-11-11 PROCEDURE — 84100 ASSAY OF PHOSPHORUS: CPT

## 2017-11-11 PROCEDURE — 80048 BASIC METABOLIC PNL TOTAL CA: CPT

## 2017-11-11 PROCEDURE — 82962 GLUCOSE BLOOD TEST: CPT

## 2017-11-11 PROCEDURE — 86901 BLOOD TYPING SEROLOGIC RH(D): CPT

## 2017-11-11 PROCEDURE — 36415 COLL VENOUS BLD VENIPUNCTURE: CPT

## 2017-11-11 PROCEDURE — 81003 URINALYSIS AUTO W/O SCOPE: CPT

## 2017-11-11 PROCEDURE — 84481 FREE ASSAY (FT-3): CPT

## 2017-11-11 PROCEDURE — 93005 ELECTROCARDIOGRAM TRACING: CPT

## 2017-11-11 PROCEDURE — 85018 HEMOGLOBIN: CPT

## 2017-11-11 PROCEDURE — 88307 TISSUE EXAM BY PATHOLOGIST: CPT

## 2017-11-11 PROCEDURE — 97162 PT EVAL MOD COMPLEX 30 MIN: CPT

## 2017-11-11 PROCEDURE — 97116 GAIT TRAINING THERAPY: CPT

## 2017-11-11 PROCEDURE — C1889: CPT

## 2017-11-11 PROCEDURE — 82553 CREATINE MB FRACTION: CPT

## 2017-11-11 PROCEDURE — 84295 ASSAY OF SERUM SODIUM: CPT

## 2017-11-11 PROCEDURE — 82803 BLOOD GASES ANY COMBINATION: CPT

## 2017-11-11 PROCEDURE — 85027 COMPLETE CBC AUTOMATED: CPT

## 2017-11-11 PROCEDURE — 76536 US EXAM OF HEAD AND NECK: CPT

## 2017-11-11 PROCEDURE — 94150 VITAL CAPACITY TEST: CPT

## 2017-11-11 PROCEDURE — 83880 ASSAY OF NATRIURETIC PEPTIDE: CPT

## 2017-11-11 PROCEDURE — 84439 ASSAY OF FREE THYROXINE: CPT

## 2017-11-11 PROCEDURE — 90686 IIV4 VACC NO PRSV 0.5 ML IM: CPT

## 2017-11-11 PROCEDURE — 85730 THROMBOPLASTIN TIME PARTIAL: CPT

## 2017-11-11 PROCEDURE — 83605 ASSAY OF LACTIC ACID: CPT

## 2017-11-11 PROCEDURE — 84443 ASSAY THYROID STIM HORMONE: CPT

## 2017-11-11 PROCEDURE — 88305 TISSUE EXAM BY PATHOLOGIST: CPT

## 2017-11-11 PROCEDURE — 88311 DECALCIFY TISSUE: CPT

## 2017-11-11 PROCEDURE — P9045: CPT

## 2017-11-11 PROCEDURE — 71045 X-RAY EXAM CHEST 1 VIEW: CPT

## 2017-11-11 PROCEDURE — 86900 BLOOD TYPING SEROLOGIC ABO: CPT

## 2017-11-11 PROCEDURE — 85025 COMPLETE CBC W/AUTO DIFF WBC: CPT

## 2017-11-11 PROCEDURE — 86923 COMPATIBILITY TEST ELECTRIC: CPT

## 2017-11-11 PROCEDURE — 80053 COMPREHEN METABOLIC PANEL: CPT

## 2017-11-11 PROCEDURE — C1887: CPT

## 2017-11-11 PROCEDURE — 83735 ASSAY OF MAGNESIUM: CPT

## 2017-11-11 PROCEDURE — 86850 RBC ANTIBODY SCREEN: CPT

## 2017-11-11 PROCEDURE — 71010: CPT | Mod: 26

## 2017-11-11 PROCEDURE — C1769: CPT

## 2017-11-11 PROCEDURE — 93880 EXTRACRANIAL BILAT STUDY: CPT

## 2017-11-11 RX ORDER — ACETAMINOPHEN WITH CODEINE 300MG-30MG
1 TABLET ORAL
Qty: 0 | Refills: 0 | COMMUNITY
Start: 2017-11-11

## 2017-11-11 RX ORDER — FUROSEMIDE 40 MG
1 TABLET ORAL
Qty: 0 | Refills: 0 | COMMUNITY
Start: 2017-11-11

## 2017-11-11 RX ORDER — FUROSEMIDE 40 MG
1 TABLET ORAL
Qty: 30 | Refills: 0
Start: 2017-11-11 | End: 2017-12-11

## 2017-11-11 RX ORDER — METOPROLOL TARTRATE 50 MG
1 TABLET ORAL
Qty: 0 | Refills: 0 | COMMUNITY
Start: 2017-11-11

## 2017-11-11 RX ORDER — ACETAMINOPHEN WITH CODEINE 300MG-30MG
1 TABLET ORAL
Qty: 20 | Refills: 0 | OUTPATIENT
Start: 2017-11-11 | End: 2017-11-16

## 2017-11-11 RX ORDER — POTASSIUM CHLORIDE 20 MEQ
1 PACKET (EA) ORAL
Qty: 30 | Refills: 0
Start: 2017-11-11 | End: 2017-12-11

## 2017-11-11 RX ORDER — POTASSIUM CHLORIDE 20 MEQ
1 PACKET (EA) ORAL
Qty: 0 | Refills: 0 | COMMUNITY
Start: 2017-11-11

## 2017-11-11 RX ORDER — INFLUENZA VIRUS VACCINE 15; 15; 15; 15 UG/.5ML; UG/.5ML; UG/.5ML; UG/.5ML
0.5 SUSPENSION INTRAMUSCULAR ONCE
Qty: 0 | Refills: 0 | Status: COMPLETED | OUTPATIENT
Start: 2017-11-11 | End: 2017-11-11

## 2017-11-11 RX ORDER — METOPROLOL TARTRATE 50 MG
1 TABLET ORAL
Qty: 60 | Refills: 0
Start: 2017-11-11 | End: 2017-12-11

## 2017-11-11 RX ADMIN — Medication 25 MILLIGRAM(S): at 12:16

## 2017-11-11 RX ADMIN — Medication 25 MILLIGRAM(S): at 05:50

## 2017-11-11 RX ADMIN — HEPARIN SODIUM 5000 UNIT(S): 5000 INJECTION INTRAVENOUS; SUBCUTANEOUS at 12:16

## 2017-11-11 RX ADMIN — POLYETHYLENE GLYCOL 3350 17 GRAM(S): 17 POWDER, FOR SOLUTION ORAL at 06:01

## 2017-11-11 RX ADMIN — Medication 1 TABLET(S): at 15:10

## 2017-11-11 RX ADMIN — Medication 81 MILLIGRAM(S): at 12:16

## 2017-11-11 RX ADMIN — Medication 100 MILLIGRAM(S): at 05:50

## 2017-11-11 RX ADMIN — Medication 2 TABLET(S): at 07:30

## 2017-11-11 RX ADMIN — Medication 25 MILLIGRAM(S): at 00:54

## 2017-11-11 RX ADMIN — Medication 2 TABLET(S): at 06:46

## 2017-11-11 RX ADMIN — Medication 10 MILLIEQUIVALENT(S): at 12:16

## 2017-11-11 RX ADMIN — SODIUM CHLORIDE 3 MILLILITER(S): 9 INJECTION INTRAMUSCULAR; INTRAVENOUS; SUBCUTANEOUS at 05:52

## 2017-11-11 RX ADMIN — INFLUENZA VIRUS VACCINE 0.5 MILLILITER(S): 15; 15; 15; 15 SUSPENSION INTRAMUSCULAR at 13:04

## 2017-11-11 RX ADMIN — Medication 20 MILLIGRAM(S): at 05:50

## 2017-11-11 RX ADMIN — HEPARIN SODIUM 5000 UNIT(S): 5000 INJECTION INTRAVENOUS; SUBCUTANEOUS at 05:50

## 2017-11-11 RX ADMIN — Medication 100 MILLIGRAM(S): at 12:16

## 2017-11-11 RX ADMIN — Medication 1 TABLET(S): at 14:37

## 2017-11-11 RX ADMIN — FAMOTIDINE 20 MILLIGRAM(S): 10 INJECTION INTRAVENOUS at 05:50

## 2017-11-11 NOTE — PROGRESS NOTE ADULT - SUBJECTIVE AND OBJECTIVE BOX
Patient discussed on morning rounds with       Operation / Date:     Surgeon:    Referring Physician:    SUBJECTIVE ASSESSMENT:  56y Male     Hospital Course: 55yo male with PMHx significant for severe Aortic Stenosis (with MAYRA 0.6cm2) with bicuspid valve and childhood asthma who presented for routine checkup for evaluation of bicuspid valve with aortic stenosis.  He was referred to Dr. Barahona for severe Aortic stenosis. On 11/6 he was admitted for preoperative evaluation. Cardiac catheterization revealed non obstructive CAD. CT chest showed left lower lobe pleural base nodular density of lung. Dr. Trotter from thoracic surgery service was consulted.     On 11/08/2017 he underwent Aortic valve replacement with Dr. Barahona and subsequent LVATS, LLL wedge resection with Dr. Trotter. His operative course was uncomplicated. He arrived to CTICU on low dose Levophed, hemodynamically stable. He was extubated without complication on POD #0. On POD #1 he had a brief run of PAF. He was given an Amiodarone bolus and started on beta blockade. He remained in NSR. He transferred to step down unit on postoperative day 2. He ambulated well independently. He made steady progress and was discharged home on POD #3    Vital Signs Last 24 Hrs  T(C): 36.4 (11 Nov 2017 09:30), Max: 36.8 (10 Nov 2017 20:35)  T(F): 97.5 (11 Nov 2017 09:30), Max: 98.3 (10 Nov 2017 20:35)  HR: 87 (11 Nov 2017 10:23) (82 - 102)  BP: 136/76 (11 Nov 2017 10:23) (119/63 - 168/70)  BP(mean): 100 (11 Nov 2017 05:39) (84 - 107)  RR: 17 (11 Nov 2017 10:23) (14 - 17)  SpO2: 98% (11 Nov 2017 10:23) (95% - 98%)  I&O's Detail    10 Nov 2017 07:01  -  11 Nov 2017 07:00  --------------------------------------------------------  IN:  Total IN: 0 mL    OUT:    Voided: 2950 mL  Total OUT: 2950 mL    Total NET: -2950 mL          EPICARDIAL WIRES REMOVED: Yes/No.  TIE DOWNS REMOVED: Yes/No.    PHYSICAL EXAM:    General:     Neurological:    Cardiovascular:    Respiratory:    Gastrointestinal:    Extremities:    Vascular:    Incision Sites:    LABS:                        10.2   8.6   )-----------( 199      ( 11 Nov 2017 06:46 )             30.0       COUMADIN:  Yes/No.        DOSE:                  INDICATION:                GOAL INR:    PT/INR - ( 11 Nov 2017 06:46 )   PT: 12.5 sec;   INR: 1.12          PTT - ( 11 Nov 2017 06:46 )  PTT:28.3 sec    11-11    138  |  100  |  13  ----------------------------<  98  4.3   |  27  |  0.85    Ca    9.5      11 Nov 2017 06:46  Phos  3.2     11-11  Mg     2.2     11-11    TPro  6.6  /  Alb  3.6  /  TBili  0.4  /  DBili  x   /  AST  32  /  ALT  20  /  AlkPhos  52  11-11          MEDICATIONS  (STANDING):  aspirin enteric coated 81 milliGRAM(s) Oral daily  docusate sodium 100 milliGRAM(s) Oral three times a day  famotidine    Tablet 20 milliGRAM(s) Oral two times a day  furosemide    Tablet 20 milliGRAM(s) Oral daily  heparin  Injectable 5000 Unit(s) SubCutaneous every 8 hours  insulin lispro (HumaLOG) corrective regimen sliding scale   SubCutaneous Before meals and at bedtime  metoprolol     tartrate 25 milliGRAM(s) Oral every 6 hours  potassium chloride    Tablet ER 10 milliEquivalent(s) Oral daily  senna 2 Tablet(s) Oral at bedtime  sodium chloride 0.45%. 1000 milliLiter(s) (10 mL/Hr) IV Continuous <Continuous>  sodium chloride 0.9% lock flush 3 milliLiter(s) IV Push every 8 hours      Discharge CXR:    Discharge ECHO: Patient discussed on morning rounds with Dr. Greenberg    Operation / Date: AVR on 11/08/17    Surgeon: Nick Barahona    Referring Physician: Selena Dey MD    SUBJECTIVE ASSESSMENT:  56y Male     Hospital Course: 55yo male with PMHx significant for severe Aortic Stenosis (with MAYRA 0.6cm2) with bicuspid valve and childhood asthma who presented for routine checkup for evaluation of bicuspid valve with aortic stenosis.  He was referred to Dr. Barahona for severe Aortic stenosis. On 11/6 he was admitted for preoperative evaluation. Cardiac catheterization revealed non obstructive CAD. CT chest showed left lower lobe pleural base nodular density of lung. Dr. Trotter from thoracic surgery service was consulted.     On 11/08/2017 he underwent Aortic valve replacement with Dr. Barahona and subsequent LVATS, LLL wedge resection with Dr. Trotter. His operative course was uncomplicated. He arrived to CTICU on low dose Levophed, hemodynamically stable. He was extubated without complication on POD #0. On POD #1 he had a brief run of PAF. He was given an Amiodarone bolus and started on beta blockade. He remained in NSR. He transferred to step down unit on postoperative day 2. He ambulated well independently. He made steady progress and was discharged home on POD #3    Vital Signs Last 24 Hrs  T(C): 36.4 (11 Nov 2017 09:30), Max: 36.8 (10 Nov 2017 20:35)  T(F): 97.5 (11 Nov 2017 09:30), Max: 98.3 (10 Nov 2017 20:35)  HR: 87 (11 Nov 2017 10:23) (82 - 102) SR  BP: 136/76 (11 Nov 2017 10:23) (119/63 - 168/70)  BP(mean): 100 (11 Nov 2017 05:39) (84 - 107)  RR: 17 (11 Nov 2017 10:23) (14 - 17)  SpO2: 98% (11 Nov 2017 10:23) (95% - 98%) RA  I&O's Detail    10 Nov 2017 07:01  -  11 Nov 2017 07:00  --------------------------------------------------------  IN:  Total IN: 0 mL    OUT:    Voided: 2950 mL  Total OUT: 2950 mL    Total NET: -2950 mL          EPICARDIAL WIRES REMOVED: Yes  TIE DOWNS REMOVED: Yes    PHYSICAL EXAM:    General: NAD    Neurological: A&O, non focal    Cardiovascular: RRR no m/r/g    Respiratory: CTABL    Gastrointestinal: +BS, soft non tender    Extremities: warm no edema    Incision Sites: c/d/i    LABS:                        10.2   8.6   )-----------( 199      ( 11 Nov 2017 06:46 )             30.0       PT/INR - ( 11 Nov 2017 06:46 )   PT: 12.5 sec;   INR: 1.12          PTT - ( 11 Nov 2017 06:46 )  PTT:28.3 sec    11-11    138  |  100  |  13  ----------------------------<  98  4.3   |  27  |  0.85    Ca    9.5      11 Nov 2017 06:46  Phos  3.2     11-11  Mg     2.2     11-11    TPro  6.6  /  Alb  3.6  /  TBili  0.4  /  DBili  x   /  AST  32  /  ALT  20  /  AlkPhos  52  11-11          MEDICATIONS  (STANDING):  aspirin enteric coated 81 milliGRAM(s) Oral daily  docusate sodium 100 milliGRAM(s) Oral three times a day  famotidine    Tablet 20 milliGRAM(s) Oral two times a day  furosemide    Tablet 20 milliGRAM(s) Oral daily  heparin  Injectable 5000 Unit(s) SubCutaneous every 8 hours  insulin lispro (HumaLOG) corrective regimen sliding scale   SubCutaneous Before meals and at bedtime  metoprolol     tartrate 25 milliGRAM(s) Oral every 6 hours  potassium chloride    Tablet ER 10 milliEquivalent(s) Oral daily  senna 2 Tablet(s) Oral at bedtime        Discharge CXR: 11/11: no acute infiltrates    Discharge ECHO: Patient discussed on morning rounds with Dr. Greenberg    Operation / Date: AVR on 11/08/17    Surgeon: Nick Barahona    Referring Physician: Selena Dey MD    SUBJECTIVE ASSESSMENT:  56y Male     Hospital Course: 57yo male with PMHx significant for severe Aortic Stenosis (with MAYRA 0.6cm2) with bicuspid valve and childhood asthma who presented for routine checkup for evaluation of bicuspid valve with aortic stenosis.  He was referred to Dr. Barahona for severe Aortic stenosis. On 11/6 he was admitted for preoperative evaluation. Cardiac catheterization revealed non obstructive CAD. CT chest showed left lower lobe pleural base nodular density of lung. Dr. Trotter from thoracic surgery service was consulted.     On 11/08/2017 he underwent Aortic valve replacement with Dr. Barahona and subsequent LVATS, LLL wedge resection with Dr. Trotter. His operative course was uncomplicated. He arrived to CTICU on low dose Levophed, hemodynamically stable. He was extubated without complication on POD #0. On POD #1 he had a brief run of PAF. He was given an Amiodarone bolus and started on beta blockade. He remained in NSR. He transferred to step down unit on postoperative day 2. He ambulated well independently. He made steady progress and was discharged home on POD #3    Vital Signs Last 24 Hrs  T(C): 36.4 (11 Nov 2017 09:30), Max: 36.8 (10 Nov 2017 20:35)  T(F): 97.5 (11 Nov 2017 09:30), Max: 98.3 (10 Nov 2017 20:35)  HR: 87 (11 Nov 2017 10:23) (82 - 102) SR  BP: 136/76 (11 Nov 2017 10:23) (119/63 - 168/70)  BP(mean): 100 (11 Nov 2017 05:39) (84 - 107)  RR: 17 (11 Nov 2017 10:23) (14 - 17)  SpO2: 98% (11 Nov 2017 10:23) (95% - 98%) RA  I&O's Detail    10 Nov 2017 07:01  -  11 Nov 2017 07:00  --------------------------------------------------------  IN:  Total IN: 0 mL    OUT:    Voided: 2950 mL  Total OUT: 2950 mL    Total NET: -2950 mL          EPICARDIAL WIRES REMOVED: Yes  TIE DOWNS REMOVED: Yes    PHYSICAL EXAM:    General: NAD    Neurological: A&O, non focal    Cardiovascular: RRR no m/r/g    Respiratory: CTABL    Gastrointestinal: +BS, soft non tender    Extremities: warm no edema    Incision Sites: c/d/i    LABS:                        10.2   8.6   )-----------( 199      ( 11 Nov 2017 06:46 )             30.0       PT/INR - ( 11 Nov 2017 06:46 )   PT: 12.5 sec;   INR: 1.12          PTT - ( 11 Nov 2017 06:46 )  PTT:28.3 sec    11-11    138  |  100  |  13  ----------------------------<  98  4.3   |  27  |  0.85    Ca    9.5      11 Nov 2017 06:46  Phos  3.2     11-11  Mg     2.2     11-11    TPro  6.6  /  Alb  3.6  /  TBili  0.4  /  DBili  x   /  AST  32  /  ALT  20  /  AlkPhos  52  11-11          MEDICATIONS  (STANDING):  aspirin enteric coated 81 milliGRAM(s) Oral daily  docusate sodium 100 milliGRAM(s) Oral three times a day  famotidine    Tablet 20 milliGRAM(s) Oral two times a day  furosemide    Tablet 20 milliGRAM(s) Oral daily  heparin  Injectable 5000 Unit(s) SubCutaneous every 8 hours  insulin lispro (HumaLOG) corrective regimen sliding scale   SubCutaneous Before meals and at bedtime  metoprolol     tartrate 25 milliGRAM(s) Oral every 6 hours  potassium chloride    Tablet ER 10 milliEquivalent(s) Oral daily  senna 2 Tablet(s) Oral at bedtime        Discharge CXR: 11/11: no acute infiltrates

## 2017-11-11 NOTE — PROGRESS NOTE ADULT - ASSESSMENT
55 yo M with history of severe AS (MAYRA 0.6cm2, bicuspid valve) referred to Dr. Barahona. Preop workup revealed LLL pleural base nodular opacity. Postop brief PAF. Postop 55 yo M with history of severe AS (MAYRA 0.6cm2, bicuspid valve) referred to Dr. Barahona. Preop workup revealed LLL pleural base nodular opacity. Postop brief PAF. Postop     -Cardiac: HD stable. Lopressor changed to 50mg BID for d/c. Con't ASA.  -Pulm: Stable on RA.   -GI: +BM.  Tolerating PO's. No issues.   -Renal: Home with Lasix/potassium.   -Dispo: Home today with [facility], follow up w/ Dr. Barahona and Dr. Trotter - pt to call on Monday to make an appt.

## 2017-11-11 NOTE — PROGRESS NOTE ADULT - PROVIDER SPECIALTY LIST ADULT
CT Surgery
Critical Care
CT Surgery
Critical Care

## 2017-11-14 LAB — SURGICAL PATHOLOGY STUDY: SIGNIFICANT CHANGE UP

## 2017-11-15 DIAGNOSIS — J45.909 UNSPECIFIED ASTHMA, UNCOMPLICATED: ICD-10-CM

## 2017-11-15 DIAGNOSIS — J94.8 OTHER SPECIFIED PLEURAL CONDITIONS: ICD-10-CM

## 2017-11-15 DIAGNOSIS — I48.91 UNSPECIFIED ATRIAL FIBRILLATION: ICD-10-CM

## 2017-11-15 DIAGNOSIS — I25.10 ATHEROSCLEROTIC HEART DISEASE OF NATIVE CORONARY ARTERY WITHOUT ANGINA PECTORIS: ICD-10-CM

## 2017-11-15 DIAGNOSIS — I35.0 NONRHEUMATIC AORTIC (VALVE) STENOSIS: ICD-10-CM

## 2017-11-15 DIAGNOSIS — Z87.442 PERSONAL HISTORY OF URINARY CALCULI: ICD-10-CM

## 2017-11-15 DIAGNOSIS — I47.2 VENTRICULAR TACHYCARDIA: ICD-10-CM

## 2017-11-15 DIAGNOSIS — Z82.49 FAMILY HISTORY OF ISCHEMIC HEART DISEASE AND OTHER DISEASES OF THE CIRCULATORY SYSTEM: ICD-10-CM

## 2017-11-15 DIAGNOSIS — I25.82 CHRONIC TOTAL OCCLUSION OF CORONARY ARTERY: ICD-10-CM

## 2017-11-15 DIAGNOSIS — R91.1 SOLITARY PULMONARY NODULE: ICD-10-CM

## 2017-11-15 DIAGNOSIS — Z80.0 FAMILY HISTORY OF MALIGNANT NEOPLASM OF DIGESTIVE ORGANS: ICD-10-CM

## 2017-11-15 DIAGNOSIS — I50.32 CHRONIC DIASTOLIC (CONGESTIVE) HEART FAILURE: ICD-10-CM

## 2017-11-15 DIAGNOSIS — Z79.82 LONG TERM (CURRENT) USE OF ASPIRIN: ICD-10-CM

## 2017-11-15 DIAGNOSIS — Q23.1 CONGENITAL INSUFFICIENCY OF AORTIC VALVE: ICD-10-CM

## 2017-11-16 RX ORDER — ASPIRIN ENTERIC COATED TABLETS 81 MG 81 MG/1
81 TABLET, DELAYED RELEASE ORAL DAILY
Qty: 60 | Refills: 0 | Status: COMPLETED | COMMUNITY
Start: 2017-10-30 | End: 2017-11-16

## 2017-11-19 ENCOUNTER — FORM ENCOUNTER (OUTPATIENT)
Age: 57
End: 2017-11-19

## 2017-11-20 ENCOUNTER — OUTPATIENT (OUTPATIENT)
Dept: OUTPATIENT SERVICES | Facility: HOSPITAL | Age: 57
LOS: 1 days | End: 2017-11-20
Payer: MEDICAID

## 2017-11-20 ENCOUNTER — APPOINTMENT (OUTPATIENT)
Dept: THORACIC SURGERY | Facility: CLINIC | Age: 57
End: 2017-11-20
Payer: MEDICAID

## 2017-11-20 ENCOUNTER — APPOINTMENT (OUTPATIENT)
Dept: CARDIOTHORACIC SURGERY | Facility: CLINIC | Age: 57
End: 2017-11-20
Payer: MEDICAID

## 2017-11-20 VITALS
HEART RATE: 94 BPM | TEMPERATURE: 97.7 F | BODY MASS INDEX: 26.58 KG/M2 | DIASTOLIC BLOOD PRESSURE: 82 MMHG | RESPIRATION RATE: 19 BRPM | WEIGHT: 196 LBS | SYSTOLIC BLOOD PRESSURE: 140 MMHG | OXYGEN SATURATION: 95 %

## 2017-11-20 DIAGNOSIS — Z86.79 PERSONAL HISTORY OF OTHER DISEASES OF THE CIRCULATORY SYSTEM: ICD-10-CM

## 2017-11-20 DIAGNOSIS — Z87.442 PERSONAL HISTORY OF URINARY CALCULI: Chronic | ICD-10-CM

## 2017-11-20 DIAGNOSIS — Z87.74 PERSONAL HISTORY OF (CORRECTED) CONGENITAL MALFORMATIONS OF HEART AND CIRCULATORY SYSTEM: ICD-10-CM

## 2017-11-20 DIAGNOSIS — I35.0 NONRHEUMATIC AORTIC (VALVE) STENOSIS: ICD-10-CM

## 2017-11-20 DIAGNOSIS — Z09 ENCOUNTER FOR FOLLOW-UP EXAMINATION AFTER COMPLETED TREATMENT FOR CONDITIONS OTHER THAN MALIGNANT NEOPLASM: ICD-10-CM

## 2017-11-20 PROCEDURE — 71020: CPT | Mod: 26

## 2017-11-20 PROCEDURE — 99024 POSTOP FOLLOW-UP VISIT: CPT

## 2017-11-20 PROCEDURE — 71046 X-RAY EXAM CHEST 2 VIEWS: CPT

## 2017-12-15 ENCOUNTER — APPOINTMENT (OUTPATIENT)
Dept: CARDIOLOGY | Facility: CLINIC | Age: 57
End: 2017-12-15
Payer: MEDICAID

## 2017-12-15 DIAGNOSIS — R06.00 DYSPNEA, UNSPECIFIED: ICD-10-CM

## 2017-12-15 DIAGNOSIS — I50.32 CHRONIC DIASTOLIC (CONGESTIVE) HEART FAILURE: ICD-10-CM

## 2017-12-15 PROCEDURE — 93015 CV STRESS TEST SUPVJ I&R: CPT

## 2017-12-15 RX ORDER — ACETAMINOPHEN WITH CODEINE 300MG-30MG
1 TABLET ORAL
Qty: 8 | Refills: 0
Start: 2017-12-15 | End: 2017-12-16

## 2017-12-26 ENCOUNTER — MESSAGE (OUTPATIENT)
Age: 57
End: 2017-12-26

## 2017-12-26 ENCOUNTER — APPOINTMENT (OUTPATIENT)
Dept: CARDIOTHORACIC SURGERY | Facility: CLINIC | Age: 57
End: 2017-12-26

## 2018-01-02 ENCOUNTER — APPOINTMENT (OUTPATIENT)
Dept: CARDIOLOGY | Facility: CLINIC | Age: 58
End: 2018-01-02

## 2018-01-02 ENCOUNTER — APPOINTMENT (OUTPATIENT)
Dept: CARDIOLOGY | Facility: CLINIC | Age: 58
End: 2018-01-02
Payer: MEDICAID

## 2018-01-02 PROCEDURE — 93798 PHYS/QHP OP CAR RHAB W/ECG: CPT

## 2018-01-10 ENCOUNTER — APPOINTMENT (OUTPATIENT)
Dept: CARDIOLOGY | Facility: CLINIC | Age: 58
End: 2018-01-10
Payer: MEDICAID

## 2018-01-10 PROCEDURE — 93798 PHYS/QHP OP CAR RHAB W/ECG: CPT

## 2018-01-15 ENCOUNTER — APPOINTMENT (OUTPATIENT)
Dept: CARDIOLOGY | Facility: CLINIC | Age: 58
End: 2018-01-15
Payer: MEDICAID

## 2018-01-15 PROCEDURE — 93798 PHYS/QHP OP CAR RHAB W/ECG: CPT

## 2018-01-17 ENCOUNTER — APPOINTMENT (OUTPATIENT)
Dept: CARDIOLOGY | Facility: CLINIC | Age: 58
End: 2018-01-17
Payer: MEDICAID

## 2018-01-17 PROCEDURE — 93798 PHYS/QHP OP CAR RHAB W/ECG: CPT

## 2018-01-18 ENCOUNTER — APPOINTMENT (OUTPATIENT)
Dept: CARDIOLOGY | Facility: CLINIC | Age: 58
End: 2018-01-18
Payer: MEDICAID

## 2018-01-18 PROCEDURE — 93798 PHYS/QHP OP CAR RHAB W/ECG: CPT

## 2018-01-22 ENCOUNTER — APPOINTMENT (OUTPATIENT)
Dept: CARDIOLOGY | Facility: CLINIC | Age: 58
End: 2018-01-22
Payer: MEDICAID

## 2018-01-22 PROCEDURE — 93798 PHYS/QHP OP CAR RHAB W/ECG: CPT

## 2018-01-23 ENCOUNTER — OTHER (OUTPATIENT)
Age: 58
End: 2018-01-23

## 2018-01-24 ENCOUNTER — APPOINTMENT (OUTPATIENT)
Dept: CARDIOLOGY | Facility: CLINIC | Age: 58
End: 2018-01-24
Payer: MEDICAID

## 2018-01-24 PROCEDURE — 93798 PHYS/QHP OP CAR RHAB W/ECG: CPT

## 2018-01-29 ENCOUNTER — APPOINTMENT (OUTPATIENT)
Dept: CARDIOLOGY | Facility: CLINIC | Age: 58
End: 2018-01-29
Payer: MEDICAID

## 2018-01-29 PROCEDURE — 93798 PHYS/QHP OP CAR RHAB W/ECG: CPT

## 2018-01-31 ENCOUNTER — APPOINTMENT (OUTPATIENT)
Dept: CARDIOLOGY | Facility: CLINIC | Age: 58
End: 2018-01-31
Payer: MEDICAID

## 2018-01-31 PROCEDURE — 93798 PHYS/QHP OP CAR RHAB W/ECG: CPT

## 2018-02-01 ENCOUNTER — APPOINTMENT (OUTPATIENT)
Dept: CARDIOLOGY | Facility: CLINIC | Age: 58
End: 2018-02-01
Payer: MEDICAID

## 2018-02-01 PROCEDURE — 93798 PHYS/QHP OP CAR RHAB W/ECG: CPT

## 2018-02-05 ENCOUNTER — APPOINTMENT (OUTPATIENT)
Dept: CARDIOLOGY | Facility: CLINIC | Age: 58
End: 2018-02-05
Payer: MEDICAID

## 2018-02-05 PROCEDURE — 93798 PHYS/QHP OP CAR RHAB W/ECG: CPT

## 2018-02-07 ENCOUNTER — APPOINTMENT (OUTPATIENT)
Dept: CARDIOLOGY | Facility: CLINIC | Age: 58
End: 2018-02-07
Payer: MEDICAID

## 2018-02-07 PROCEDURE — 93798 PHYS/QHP OP CAR RHAB W/ECG: CPT

## 2018-02-08 ENCOUNTER — APPOINTMENT (OUTPATIENT)
Dept: CARDIOLOGY | Facility: CLINIC | Age: 58
End: 2018-02-08
Payer: MEDICAID

## 2018-02-08 PROCEDURE — 93798 PHYS/QHP OP CAR RHAB W/ECG: CPT

## 2018-02-12 ENCOUNTER — APPOINTMENT (OUTPATIENT)
Dept: CARDIOLOGY | Facility: CLINIC | Age: 58
End: 2018-02-12
Payer: MEDICAID

## 2018-02-12 PROCEDURE — 93798 PHYS/QHP OP CAR RHAB W/ECG: CPT

## 2018-02-15 ENCOUNTER — APPOINTMENT (OUTPATIENT)
Dept: CARDIOLOGY | Facility: CLINIC | Age: 58
End: 2018-02-15
Payer: MEDICAID

## 2018-02-15 PROCEDURE — 93798 PHYS/QHP OP CAR RHAB W/ECG: CPT

## 2018-02-26 ENCOUNTER — APPOINTMENT (OUTPATIENT)
Dept: CARDIOLOGY | Facility: CLINIC | Age: 58
End: 2018-02-26
Payer: MEDICAID

## 2018-02-26 PROCEDURE — 93798 PHYS/QHP OP CAR RHAB W/ECG: CPT

## 2018-02-28 ENCOUNTER — APPOINTMENT (OUTPATIENT)
Dept: CARDIOLOGY | Facility: CLINIC | Age: 58
End: 2018-02-28
Payer: MEDICAID

## 2018-02-28 PROCEDURE — 93798 PHYS/QHP OP CAR RHAB W/ECG: CPT

## 2018-03-05 ENCOUNTER — APPOINTMENT (OUTPATIENT)
Dept: CARDIOLOGY | Facility: CLINIC | Age: 58
End: 2018-03-05
Payer: MEDICAID

## 2018-03-05 PROCEDURE — 93798 PHYS/QHP OP CAR RHAB W/ECG: CPT

## 2018-03-08 ENCOUNTER — APPOINTMENT (OUTPATIENT)
Dept: CARDIOLOGY | Facility: CLINIC | Age: 58
End: 2018-03-08
Payer: MEDICAID

## 2018-03-08 PROCEDURE — 93798 PHYS/QHP OP CAR RHAB W/ECG: CPT

## 2018-03-12 ENCOUNTER — APPOINTMENT (OUTPATIENT)
Dept: CARDIOLOGY | Facility: CLINIC | Age: 58
End: 2018-03-12
Payer: MEDICAID

## 2018-03-12 PROCEDURE — 93798 PHYS/QHP OP CAR RHAB W/ECG: CPT

## 2018-03-14 ENCOUNTER — APPOINTMENT (OUTPATIENT)
Dept: CARDIOLOGY | Facility: CLINIC | Age: 58
End: 2018-03-14
Payer: MEDICAID

## 2018-03-14 PROCEDURE — 93798 PHYS/QHP OP CAR RHAB W/ECG: CPT

## 2018-03-15 ENCOUNTER — APPOINTMENT (OUTPATIENT)
Dept: CARDIOLOGY | Facility: CLINIC | Age: 58
End: 2018-03-15

## 2018-03-19 ENCOUNTER — APPOINTMENT (OUTPATIENT)
Dept: CARDIOLOGY | Facility: CLINIC | Age: 58
End: 2018-03-19
Payer: MEDICAID

## 2018-03-19 PROCEDURE — 93798 PHYS/QHP OP CAR RHAB W/ECG: CPT

## 2018-03-20 PROBLEM — Z09 POSTOP CHECK: Status: RESOLVED | Noted: 2017-11-20 | Resolved: 2018-03-20

## 2018-03-22 ENCOUNTER — APPOINTMENT (OUTPATIENT)
Dept: CT IMAGING | Facility: HOSPITAL | Age: 58
End: 2018-03-22

## 2018-03-22 ENCOUNTER — APPOINTMENT (OUTPATIENT)
Dept: CARDIOLOGY | Facility: CLINIC | Age: 58
End: 2018-03-22
Payer: MEDICAID

## 2018-03-22 ENCOUNTER — APPOINTMENT (OUTPATIENT)
Dept: THORACIC SURGERY | Facility: CLINIC | Age: 58
End: 2018-03-22

## 2018-03-22 DIAGNOSIS — Z09 ENCOUNTER FOR FOLLOW-UP EXAMINATION AFTER COMPLETED TREATMENT FOR CONDITIONS OTHER THAN MALIGNANT NEOPLASM: ICD-10-CM

## 2018-03-22 PROCEDURE — 93798 PHYS/QHP OP CAR RHAB W/ECG: CPT

## 2018-03-26 ENCOUNTER — APPOINTMENT (OUTPATIENT)
Dept: CARDIOLOGY | Facility: CLINIC | Age: 58
End: 2018-03-26

## 2018-03-28 ENCOUNTER — APPOINTMENT (OUTPATIENT)
Dept: CARDIOLOGY | Facility: CLINIC | Age: 58
End: 2018-03-28
Payer: MEDICAID

## 2018-03-28 PROCEDURE — 93798 PHYS/QHP OP CAR RHAB W/ECG: CPT

## 2018-03-29 ENCOUNTER — APPOINTMENT (OUTPATIENT)
Dept: CARDIOLOGY | Facility: CLINIC | Age: 58
End: 2018-03-29
Payer: MEDICAID

## 2018-03-29 ENCOUNTER — FORM ENCOUNTER (OUTPATIENT)
Age: 58
End: 2018-03-29

## 2018-03-29 PROCEDURE — 93798 PHYS/QHP OP CAR RHAB W/ECG: CPT

## 2018-03-30 ENCOUNTER — OUTPATIENT (OUTPATIENT)
Dept: OUTPATIENT SERVICES | Facility: HOSPITAL | Age: 58
LOS: 1 days | End: 2018-03-30
Payer: MEDICAID

## 2018-03-30 ENCOUNTER — APPOINTMENT (OUTPATIENT)
Dept: CT IMAGING | Facility: IMAGING CENTER | Age: 58
End: 2018-03-30
Payer: MEDICAID

## 2018-03-30 DIAGNOSIS — Z87.442 PERSONAL HISTORY OF URINARY CALCULI: Chronic | ICD-10-CM

## 2018-03-30 DIAGNOSIS — Z00.8 ENCOUNTER FOR OTHER GENERAL EXAMINATION: ICD-10-CM

## 2018-03-30 PROCEDURE — 71250 CT THORAX DX C-: CPT | Mod: 26

## 2018-03-30 PROCEDURE — 71250 CT THORAX DX C-: CPT

## 2018-04-02 ENCOUNTER — APPOINTMENT (OUTPATIENT)
Dept: CARDIOLOGY | Facility: CLINIC | Age: 58
End: 2018-04-02
Payer: MEDICAID

## 2018-04-02 PROCEDURE — 93798 PHYS/QHP OP CAR RHAB W/ECG: CPT

## 2018-04-04 ENCOUNTER — APPOINTMENT (OUTPATIENT)
Dept: CARDIOLOGY | Facility: CLINIC | Age: 58
End: 2018-04-04
Payer: MEDICAID

## 2018-04-04 PROCEDURE — 93798 PHYS/QHP OP CAR RHAB W/ECG: CPT

## 2018-04-05 ENCOUNTER — APPOINTMENT (OUTPATIENT)
Dept: THORACIC SURGERY | Facility: CLINIC | Age: 58
End: 2018-04-05

## 2018-04-05 ENCOUNTER — APPOINTMENT (OUTPATIENT)
Dept: CARDIOLOGY | Facility: CLINIC | Age: 58
End: 2018-04-05

## 2018-04-09 ENCOUNTER — APPOINTMENT (OUTPATIENT)
Dept: CARDIOLOGY | Facility: CLINIC | Age: 58
End: 2018-04-09

## 2018-04-11 ENCOUNTER — APPOINTMENT (OUTPATIENT)
Dept: CARDIOLOGY | Facility: CLINIC | Age: 58
End: 2018-04-11

## 2018-04-12 ENCOUNTER — APPOINTMENT (OUTPATIENT)
Dept: CARDIOLOGY | Facility: CLINIC | Age: 58
End: 2018-04-12

## 2018-04-16 ENCOUNTER — APPOINTMENT (OUTPATIENT)
Dept: CARDIOLOGY | Facility: CLINIC | Age: 58
End: 2018-04-16

## 2018-04-18 ENCOUNTER — APPOINTMENT (OUTPATIENT)
Dept: CARDIOLOGY | Facility: CLINIC | Age: 58
End: 2018-04-18

## 2018-04-19 ENCOUNTER — APPOINTMENT (OUTPATIENT)
Dept: CARDIOLOGY | Facility: CLINIC | Age: 58
End: 2018-04-19

## 2018-04-25 PROBLEM — Z95.2 S/P AVR (AORTIC VALVE REPLACEMENT): Status: RESOLVED | Noted: 2017-11-17 | Resolved: 2018-04-25

## 2018-04-26 ENCOUNTER — APPOINTMENT (OUTPATIENT)
Dept: THORACIC SURGERY | Facility: CLINIC | Age: 58
End: 2018-04-26
Payer: MEDICAID

## 2018-04-26 VITALS
BODY MASS INDEX: 26.04 KG/M2 | WEIGHT: 192 LBS | TEMPERATURE: 97.3 F | HEART RATE: 64 BPM | SYSTOLIC BLOOD PRESSURE: 130 MMHG | OXYGEN SATURATION: 97 % | DIASTOLIC BLOOD PRESSURE: 77 MMHG | RESPIRATION RATE: 18 BRPM

## 2018-04-26 DIAGNOSIS — Z95.2 PRESENCE OF PROSTHETIC HEART VALVE: ICD-10-CM

## 2018-04-26 DIAGNOSIS — Z09 ENCOUNTER FOR FOLLOW-UP EXAMINATION AFTER COMPLETED TREATMENT FOR CONDITIONS OTHER THAN MALIGNANT NEOPLASM: ICD-10-CM

## 2018-04-26 DIAGNOSIS — Z95.3 PRESENCE OF XENOGENIC HEART VALVE: ICD-10-CM

## 2018-04-26 DIAGNOSIS — J84.9 INTERSTITIAL PULMONARY DISEASE, UNSPECIFIED: ICD-10-CM

## 2018-04-26 DIAGNOSIS — Z98.890 OTHER SPECIFIED POSTPROCEDURAL STATES: ICD-10-CM

## 2018-04-26 PROCEDURE — 99214 OFFICE O/P EST MOD 30 MIN: CPT

## 2018-04-26 RX ORDER — ACETAMINOPHEN AND CODEINE 300; 30 MG/1; MG/1
300-30 TABLET ORAL
Refills: 0 | Status: COMPLETED | COMMUNITY
End: 2018-04-26

## 2018-04-26 RX ORDER — FUROSEMIDE 20 MG/1
20 TABLET ORAL DAILY
Qty: 30 | Refills: 1 | Status: COMPLETED | COMMUNITY
End: 2018-04-26

## 2018-04-26 RX ORDER — POTASSIUM CHLORIDE 750 MG/1
10 CAPSULE, EXTENDED RELEASE ORAL DAILY
Qty: 30 | Refills: 1 | Status: COMPLETED | COMMUNITY
End: 2018-04-26

## 2018-06-01 ENCOUNTER — OUTPATIENT (OUTPATIENT)
Dept: OUTPATIENT SERVICES | Facility: HOSPITAL | Age: 58
LOS: 1 days | End: 2018-06-01
Payer: MEDICAID

## 2018-06-01 DIAGNOSIS — Z87.442 PERSONAL HISTORY OF URINARY CALCULI: Chronic | ICD-10-CM

## 2018-06-01 PROCEDURE — G9001: CPT

## 2018-06-25 DIAGNOSIS — R69 ILLNESS, UNSPECIFIED: ICD-10-CM

## 2018-07-01 ENCOUNTER — OUTPATIENT (OUTPATIENT)
Dept: OUTPATIENT SERVICES | Facility: HOSPITAL | Age: 58
LOS: 1 days | End: 2018-07-01

## 2018-07-01 DIAGNOSIS — Z87.442 PERSONAL HISTORY OF URINARY CALCULI: Chronic | ICD-10-CM

## 2018-07-23 PROBLEM — Z98.890 STATUS POST LUNG SURGERY: Status: RESOLVED | Noted: 2017-11-20 | Resolved: 2018-07-23

## 2018-07-24 DIAGNOSIS — Z71.89 OTHER SPECIFIED COUNSELING: ICD-10-CM

## 2019-05-08 ENCOUNTER — FORM ENCOUNTER (OUTPATIENT)
Age: 59
End: 2019-05-08

## 2019-05-09 ENCOUNTER — OUTPATIENT (OUTPATIENT)
Dept: OUTPATIENT SERVICES | Facility: HOSPITAL | Age: 59
LOS: 1 days | End: 2019-05-09
Payer: MEDICAID

## 2019-05-09 ENCOUNTER — APPOINTMENT (OUTPATIENT)
Dept: CT IMAGING | Facility: IMAGING CENTER | Age: 59
End: 2019-05-09
Payer: MEDICAID

## 2019-05-09 DIAGNOSIS — Z87.442 PERSONAL HISTORY OF URINARY CALCULI: Chronic | ICD-10-CM

## 2019-05-09 DIAGNOSIS — J84.9 INTERSTITIAL PULMONARY DISEASE, UNSPECIFIED: ICD-10-CM

## 2019-05-09 PROCEDURE — 71250 CT THORAX DX C-: CPT

## 2019-05-09 PROCEDURE — 71250 CT THORAX DX C-: CPT | Mod: 26

## 2019-06-10 ENCOUNTER — TRANSCRIPTION ENCOUNTER (OUTPATIENT)
Age: 59
End: 2019-06-10

## 2020-05-14 ENCOUNTER — OUTPATIENT (OUTPATIENT)
Dept: OUTPATIENT SERVICES | Facility: HOSPITAL | Age: 60
LOS: 1 days | End: 2020-05-14

## 2020-05-14 DIAGNOSIS — Z87.442 PERSONAL HISTORY OF URINARY CALCULI: Chronic | ICD-10-CM

## 2020-05-14 DIAGNOSIS — Z98.890 OTHER SPECIFIED POSTPROCEDURAL STATES: ICD-10-CM

## 2020-05-14 PROBLEM — I35.0 SEVERE AORTIC STENOSIS: Status: ACTIVE | Noted: 2017-10-30

## 2020-05-15 ENCOUNTER — OUTPATIENT (OUTPATIENT)
Dept: OUTPATIENT SERVICES | Facility: HOSPITAL | Age: 60
LOS: 1 days | End: 2020-05-15
Payer: MEDICAID

## 2020-05-15 ENCOUNTER — APPOINTMENT (OUTPATIENT)
Dept: CT IMAGING | Facility: IMAGING CENTER | Age: 60
End: 2020-05-15
Payer: MEDICAID

## 2020-05-15 DIAGNOSIS — Z98.890 OTHER SPECIFIED POSTPROCEDURAL STATES: ICD-10-CM

## 2020-05-15 DIAGNOSIS — J84.9 INTERSTITIAL PULMONARY DISEASE, UNSPECIFIED: ICD-10-CM

## 2020-05-15 DIAGNOSIS — Z87.442 PERSONAL HISTORY OF URINARY CALCULI: Chronic | ICD-10-CM

## 2020-05-15 PROCEDURE — 71250 CT THORAX DX C-: CPT

## 2020-05-16 PROCEDURE — 71250 CT THORAX DX C-: CPT | Mod: 26

## 2020-05-28 ENCOUNTER — APPOINTMENT (OUTPATIENT)
Dept: THORACIC SURGERY | Facility: CLINIC | Age: 60
End: 2020-05-28
Payer: MEDICAID

## 2020-05-28 DIAGNOSIS — Z98.890 OTHER SPECIFIED POSTPROCEDURAL STATES: ICD-10-CM

## 2020-05-28 PROCEDURE — 99212 OFFICE O/P EST SF 10 MIN: CPT | Mod: 95

## 2020-05-29 PROBLEM — Z98.890 HISTORY OF LUNG SURGERY: Status: ACTIVE | Noted: 2018-04-26

## 2020-06-30 NOTE — ASSESSMENT
[FreeTextEntry1] : 59 year old male with a PMH of chronic diastolic heart failure, severe aortic stenosis, LEFT lower lung pleural based nodule s/p AVR with bioprosthetic valve with Dr. Nick Barahona, and concomitant LEFT video assisted thorascopic surgery, LEFT lower lobe wedge resection done on 11/8/17. Pathology revealed mild patchy intersitital lymphocytic infiltrate, focal osseous metaplasia and fibrous pleural adhesions. \par \par Patient denies cough, hemoptysis, shortness of breath, weight change, nausea, vomiting, diarrhea, chest pain, fever, or any recent illnesses or hospitalizations. \par \par CT chest was reviewed and without evidence of abnormalities noted. No need to follow up from a Thoracic standpoint. Will email the patient the results of the CT scan. I explained that if breathing worsens down the road to call us for evaluation. \par \par Plan:\par 1. No need to follow up from a Thoracic standpoint \par \par I, NISHA DALE , am scribing for and in the presence of [Dr. Chet Trotter] the following sections: History of present illness, past Medical/family/surgical/family/social history, review of systems, vital signs, physical exam and disposition.\par \par \par \par

## 2020-06-30 NOTE — HISTORY OF PRESENT ILLNESS
[Home] : at home, [unfilled] , at the time of the visit. [Medical Office: ([facility])___] : at the medical office located in  [Verbal consent obtained from patient] : the patient, [unfilled] [FreeTextEntry1] : 59 year old male with a PMH of chronic diastolic heart failure, severe aortic stenosis, LEFT lower lung pleural based nodule s/p AVR with bioprosthetic valve with Dr. Nick Barahona, and concomitant LEFT video assisted thorascopic surgery, LEFT lower lobe wedge resection done on 11/8/17 presents for a follow up visit with a CT chest. \par \par Pathology showed mild patchy interstitial lymphocytic infiltrate, focal osseous metaplasia, and fibrous pleural adhesions. \par \par CT chest done on 3/30/18 showed clear lungs: no consolidations, edema, effusion or pneumothorax were seen. \par \par CT chest completed on 05/16/20:\par -clear lungs \par -no interstitial lung disease\par \par

## 2021-09-03 DIAGNOSIS — Z01.818 ENCOUNTER FOR OTHER PREPROCEDURAL EXAMINATION: ICD-10-CM

## 2021-09-04 ENCOUNTER — APPOINTMENT (OUTPATIENT)
Dept: DISASTER EMERGENCY | Facility: CLINIC | Age: 61
End: 2021-09-04

## 2021-09-05 LAB — SARS-COV-2 N GENE NPH QL NAA+PROBE: NOT DETECTED

## 2021-09-07 ENCOUNTER — INPATIENT (INPATIENT)
Facility: HOSPITAL | Age: 61
LOS: 0 days | Discharge: HOME | End: 2021-09-08
Attending: INTERNAL MEDICINE | Admitting: INTERNAL MEDICINE
Payer: MEDICAID

## 2021-09-07 ENCOUNTER — TRANSCRIPTION ENCOUNTER (OUTPATIENT)
Age: 61
End: 2021-09-07

## 2021-09-07 VITALS
DIASTOLIC BLOOD PRESSURE: 70 MMHG | SYSTOLIC BLOOD PRESSURE: 136 MMHG | TEMPERATURE: 98 F | OXYGEN SATURATION: 98 % | RESPIRATION RATE: 17 BRPM | HEART RATE: 72 BPM

## 2021-09-07 DIAGNOSIS — Z87.442 PERSONAL HISTORY OF URINARY CALCULI: Chronic | ICD-10-CM

## 2021-09-07 DIAGNOSIS — R06.2 WHEEZING: ICD-10-CM

## 2021-09-07 DIAGNOSIS — Z95.2 PRESENCE OF PROSTHETIC HEART VALVE: Chronic | ICD-10-CM

## 2021-09-07 LAB
ANION GAP SERPL CALC-SCNC: 9 MMOL/L — SIGNIFICANT CHANGE UP (ref 7–14)
BUN SERPL-MCNC: 13 MG/DL — SIGNIFICANT CHANGE UP (ref 7–23)
CALCIUM SERPL-MCNC: 9.3 MG/DL — SIGNIFICANT CHANGE UP (ref 8.4–10.5)
CHLORIDE SERPL-SCNC: 104 MMOL/L — SIGNIFICANT CHANGE UP (ref 98–107)
CO2 SERPL-SCNC: 27 MMOL/L — SIGNIFICANT CHANGE UP (ref 22–31)
CREAT SERPL-MCNC: 0.9 MG/DL — SIGNIFICANT CHANGE UP (ref 0.5–1.3)
GLUCOSE SERPL-MCNC: 101 MG/DL — HIGH (ref 70–99)
HCT VFR BLD CALC: 39.6 % — SIGNIFICANT CHANGE UP (ref 39–50)
HGB BLD-MCNC: 13.4 G/DL — SIGNIFICANT CHANGE UP (ref 13–17)
MCHC RBC-ENTMCNC: 29.1 PG — SIGNIFICANT CHANGE UP (ref 27–34)
MCHC RBC-ENTMCNC: 33.8 GM/DL — SIGNIFICANT CHANGE UP (ref 32–36)
MCV RBC AUTO: 86.1 FL — SIGNIFICANT CHANGE UP (ref 80–100)
NRBC # BLD: 0 /100 WBCS — SIGNIFICANT CHANGE UP
NRBC # FLD: 0 K/UL — SIGNIFICANT CHANGE UP
PLATELET # BLD AUTO: 222 K/UL — SIGNIFICANT CHANGE UP (ref 150–400)
POTASSIUM SERPL-MCNC: 4.9 MMOL/L — SIGNIFICANT CHANGE UP (ref 3.5–5.3)
POTASSIUM SERPL-SCNC: 4.9 MMOL/L — SIGNIFICANT CHANGE UP (ref 3.5–5.3)
RBC # BLD: 4.6 M/UL — SIGNIFICANT CHANGE UP (ref 4.2–5.8)
RBC # FLD: 11.2 % — SIGNIFICANT CHANGE UP (ref 10.3–14.5)
SODIUM SERPL-SCNC: 140 MMOL/L — SIGNIFICANT CHANGE UP (ref 135–145)
WBC # BLD: 6.62 K/UL — SIGNIFICANT CHANGE UP (ref 3.8–10.5)
WBC # FLD AUTO: 6.62 K/UL — SIGNIFICANT CHANGE UP (ref 3.8–10.5)

## 2021-09-07 PROCEDURE — 93454 CORONARY ARTERY ANGIO S&I: CPT | Mod: 26,59

## 2021-09-07 PROCEDURE — 92928 PRQ TCAT PLMT NTRAC ST 1 LES: CPT | Mod: RC

## 2021-09-07 PROCEDURE — 99152 MOD SED SAME PHYS/QHP 5/>YRS: CPT

## 2021-09-07 PROCEDURE — 93010 ELECTROCARDIOGRAM REPORT: CPT | Mod: 76

## 2021-09-07 RX ORDER — ASPIRIN/CALCIUM CARB/MAGNESIUM 324 MG
1 TABLET ORAL
Qty: 0 | Refills: 0 | DISCHARGE

## 2021-09-07 RX ORDER — SODIUM CHLORIDE 9 MG/ML
3 INJECTION INTRAMUSCULAR; INTRAVENOUS; SUBCUTANEOUS EVERY 8 HOURS
Refills: 0 | Status: DISCONTINUED | OUTPATIENT
Start: 2021-09-07 | End: 2021-09-08

## 2021-09-07 RX ORDER — ATORVASTATIN CALCIUM 80 MG/1
80 TABLET, FILM COATED ORAL AT BEDTIME
Refills: 0 | Status: DISCONTINUED | OUTPATIENT
Start: 2021-09-07 | End: 2021-09-08

## 2021-09-07 RX ORDER — PANTOPRAZOLE SODIUM 20 MG/1
1 TABLET, DELAYED RELEASE ORAL
Qty: 0 | Refills: 0 | DISCHARGE

## 2021-09-07 RX ORDER — PANTOPRAZOLE SODIUM 20 MG/1
40 TABLET, DELAYED RELEASE ORAL
Refills: 0 | Status: DISCONTINUED | OUTPATIENT
Start: 2021-09-07 | End: 2021-09-08

## 2021-09-07 RX ORDER — ASPIRIN/CALCIUM CARB/MAGNESIUM 324 MG
81 TABLET ORAL DAILY
Refills: 0 | Status: DISCONTINUED | OUTPATIENT
Start: 2021-09-07 | End: 2021-09-08

## 2021-09-07 RX ORDER — CLOPIDOGREL BISULFATE 75 MG/1
75 TABLET, FILM COATED ORAL DAILY
Refills: 0 | Status: DISCONTINUED | OUTPATIENT
Start: 2021-09-08 | End: 2021-09-08

## 2021-09-07 RX ORDER — METOPROLOL TARTRATE 50 MG
50 TABLET ORAL
Refills: 0 | Status: DISCONTINUED | OUTPATIENT
Start: 2021-09-07 | End: 2021-09-08

## 2021-09-07 RX ORDER — ATORVASTATIN CALCIUM 80 MG/1
1 TABLET, FILM COATED ORAL
Qty: 0 | Refills: 0 | DISCHARGE

## 2021-09-07 RX ADMIN — SODIUM CHLORIDE 3 MILLILITER(S): 9 INJECTION INTRAMUSCULAR; INTRAVENOUS; SUBCUTANEOUS at 22:04

## 2021-09-07 RX ADMIN — ATORVASTATIN CALCIUM 80 MILLIGRAM(S): 80 TABLET, FILM COATED ORAL at 21:06

## 2021-09-07 RX ADMIN — Medication 50 MILLIGRAM(S): at 19:45

## 2021-09-07 RX ADMIN — SODIUM CHLORIDE 3 MILLILITER(S): 9 INJECTION INTRAMUSCULAR; INTRAVENOUS; SUBCUTANEOUS at 19:30

## 2021-09-07 NOTE — H&P CARDIOLOGY - NSICDXPASTSURGICALHX_GEN_ALL_CORE_FT
PAST SURGICAL HISTORY:  History of kidney stones     S/P AVR (aortic valve replacement) bioprosthetic valve 2017

## 2021-09-07 NOTE — CHART NOTE - NSCHARTNOTEFT_GEN_A_CORE
ANTONIO GARCÍA 60yrs s/p cardiac cath via right radial  access.  Site is stable with no hematoma, active bleed or swelling.  Dressing is clean/dry/intact.  DP pulse is palpable.  Patient denies pain, numbness, tingling, CP, SOB. VSS. Will continue to monitor.     T(C): 36.9 (09-07-21 @ 22:12), Max: 36.9 (09-07-21 @ 22:12)  HR: 70 (09-07-21 @ 22:12) (69 - 72)  BP: 129/69 (09-07-21 @ 22:12) (129/69 - 137/68)  RR: 16 (09-07-21 @ 22:12) (16 - 17)  SpO2: 100% (09-07-21 @ 22:12) (98% - 100%)

## 2021-09-07 NOTE — H&P CARDIOLOGY - URINARY CATHETER
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 x-ray done last Monday, 3 days ago  She was told that she has arthritis in her knee  She was instructed by the orthopedic doctor to follow up in 2 weeks for cortisone injection  Today she went to reach for something and she planted and twisted and felt a sudden pop in her knee  She complained of pain over the medial aspect of her knee  She complains of pain that is worse with weight-bearing and flexion  She denies any giving way  History provided by:  Patient  Knee Pain   Location:  Knee  Time since incident:  2 hours  Injury: yes    Mechanism of injury comment:  Plan today and twisted her right knee describing a pop an onset of pain  Knee location:  R knee  Pain details:     Quality:  Aching    Radiates to:  Does not radiate    Severity:  Moderate    Onset quality:  Sudden    Duration:  2 hours    Timing:  Constant    Progression:  Waxing and waning  Chronicity:  New  Prior injury to area:  No  Relieved by:  Rest  Worsened by:  Bearing weight and flexion  Ineffective treatments:  None tried  Associated symptoms: decreased ROM and stiffness    Associated symptoms: no back pain, no fatigue, no fever, no itching, no muscle weakness, no neck pain, no numbness, no swelling and no tingling    Risk factors: no concern for non-accidental trauma, no frequent fractures, no known bone disorder, no obesity and no recent illness        Prior to Admission Medications   Prescriptions Last Dose Informant Patient Reported? Taking? LORazepam (ATIVAN) 1 mg tablet   Yes No   Sig: Take 1 mg by mouth Medrol Dose Pack scheduling ONLY     TiZANidine (ZANAFLEX) 4 MG capsule   Yes No   Sig: Take 4 mg by mouth 3 (three) times a day  busPIRone (BUSPAR) 5 mg tablet   Yes No   Sig: Take 5 mg by mouth 3 (three) times a day  Take two tabs hs   estradiol (ESTRACE) 0 5 MG tablet   Yes No   Sig: Take 0 5 mg by mouth 2 (two) times a day  methimazole (TAPAZOLE) 5 mg tablet   Yes No   Sig: Take 5 mg by mouth 3 (three) times a day  norethindrone (AYGESTIN) 5 mg tablet   Yes No   Sig: Take 5 mg by mouth daily  Daily for ten days every other month   pregabalin (LYRICA) 50 mg capsule   Yes No   Sig: Take 50 mg by mouth 3 (three) times a day  propranolol (INDERAL) 10 mg tablet   Yes No   Sig: Take 10 mg by mouth 3 (three) times a day  quinapril (ACCUPRIL) 20 mg tablet   Yes No   Sig: Take 20 mg by mouth daily at bedtime  triamterene-hydrochlorothiazide (DYAZIDE) 37 5-25 mg per capsule   Yes No   Sig: Take 1 capsule by mouth every morning  Facility-Administered Medications: None       Past Medical History:   Diagnosis Date    Anxiety     Cataract, bilateral     Cervical radiculopathy     Disc disease, degenerative, cervical     Herpes simplex infection     Hypertension     Insomnia     Low back pain     Lumbar degenerative disc disease     Lumbar facet arthropathy     Lumbar radiculopathy     Lumbar stenosis without neurogenic claudication     Mononucleosis     Myofascial pain dysfunction syndrome     Osteoarthritis     Plantar fasciitis     Ptosis, congenital, left     Retinal detachment     Sacroiliitis (HCC)     Thyroid disease     Thyrotoxicosis     Vertigo        Past Surgical History:   Procedure Laterality Date    [place] SURG IMPLNT NEUROELECT,EPIDURAL Left 1/26/2016    Procedure: PLACEMENT OF THORACIC SPINAL CORD STIMULATOR WITH LEFT BUTTOCK IMPLANTABLE PULSE GENERATOR (IMPULSE MONITORING); Surgeon: Deep Romo MD;  Location:  MAIN OR;  Service: Neurosurgery    RETINAL DETACHMENT SURGERY Right        History reviewed   No pertinent family history  I have reviewed and agree with the history as documented  Social History   Substance Use Topics    Smoking status: Never Smoker    Smokeless tobacco: Never Used    Alcohol use No        Review of Systems   Constitutional: Positive for activity change  Negative for appetite change, chills, fatigue and fever  Musculoskeletal: Positive for arthralgias and stiffness  Negative for back pain and neck pain  Skin: Negative for color change, itching, pallor, rash and wound  Psychiatric/Behavioral: Negative for confusion  All other systems reviewed and are negative  Physical Exam  ED Triage Vitals [11/22/17 1943]   Temperature Pulse Respirations Blood Pressure SpO2   97 8 °F (36 6 °C) 61 18 (!) 179/84 99 %      Temp Source Heart Rate Source Patient Position - Orthostatic VS BP Location FiO2 (%)   Oral Monitor Sitting Left arm --      Pain Score       8           Orthostatic Vital Signs  Vitals:    11/22/17 1943   BP: (!) 179/84   Pulse: 61   Patient Position - Orthostatic VS: Sitting       Physical Exam   Constitutional: She is oriented to person, place, and time  She appears well-developed and well-nourished  No distress  HENT:   Head: Normocephalic and atraumatic  Right Ear: External ear normal    Left Ear: External ear normal    Nose: Nose normal    Eyes: Conjunctivae are normal  Right eye exhibits no discharge  Left eye exhibits no discharge  Pulmonary/Chest: Effort normal    Musculoskeletal:   Examination of the right knee it is atraumatic upon inspection  There is tenderness palpated over the medial joint line  There is decreased range of motion secondary to pain and guarding  There is a positive Lonny sign  Negative Lachman test and medial lateral collateral ligaments are intact  Full range of motion of the right hip without discomfort  The legs are aligned  Neurological: She is alert and oriented to person, place, and time  Skin: Skin is warm   Capillary refill takes less than 2 seconds  No rash noted  She is not diaphoretic  No erythema  No pallor  Psychiatric: She has a normal mood and affect  Her behavior is normal  Judgment and thought content normal    Nursing note and vitals reviewed  ED Medications  Medications - No data to display    Diagnostic Studies  Results Reviewed     None                 XR knee 4+ views RIGHT   ED Interpretation by Tremaine Torrez PA-C (11/22 2035)   DJD, no acute pathology                 Procedures  Procedures       Phone Contacts  ED Phone Contact    ED Course  ED Course                                MDM  Number of Diagnoses or Management Options  Degenerative joint disease: established and worsening  Right knee pain: new and requires workup     Amount and/or Complexity of Data Reviewed  Tests in the radiology section of CPT®: ordered and reviewed  Tests in the medicine section of CPT®: ordered and reviewed    Risk of Complications, Morbidity, and/or Mortality  Presenting problems: moderate  Diagnostic procedures: moderate  Management options: moderate  General comments: Patient presents emergency room after planting and twisting her right knee and having a sudden onset of pain  She was evaluated in the emergency room and did have medial joint line tenderness with a positive Lonny sign  She was sent for an x-ray which demonstrated a moderate amount of degenerative joint disease  There is no fracture identified  [facility]ellar sparing for comfort  She was given a cane for ambulation with comfort in the emergency room  She was given a prescription for Naprosyn 500 mg and instructed to take 1 pill twice daily for the next 7 days with food  She will follow up with her orthopedic surgeon as planned      Patient Progress  Patient progress: stable    CritCare Time    Disposition  Final diagnoses:   Degenerative joint disease - Right knee   Right knee pain - Acute Internal derangement right knee     Time reflects when diagnosis was documented in both MDM as applicable and the Disposition within this note     Time User Action Codes Description Comment    11/22/2017  8:41 PM Nandini Pickerel Add [M19 90] Degenerative joint disease     11/22/2017  8:41 PM Nandini Pickerel Modify [M19 90] Degenerative joint disease Right knee    11/22/2017  8:41 PM Nandini Pickerel Add [M25 561] Right knee pain     11/22/2017  8:41 PM Nandini Pickerel Modify [M25 561] Right knee pain Acute Internal derangement right knee      ED Disposition     ED Disposition Condition Comment    Discharge  Beatriz Le discharge to home/self care  Condition at discharge: Good        Follow-up Information     Follow up With Specialties Details Why Contact Info    Your orthopedic surgeon   As scheduled         Discharge Medication List as of 11/22/2017  8:43 PM      START taking these medications    Details   predniSONE 50 mg tablet Take 1 tablet by mouth daily for 5 days, Starting Wed 11/22/2017, Until Mon 11/27/2017, Print         CONTINUE these medications which have NOT CHANGED    Details   busPIRone (BUSPAR) 5 mg tablet Take 5 mg by mouth 3 (three) times a day  Take two tabs hs, Until Discontinued, Historical Med      estradiol (ESTRACE) 0 5 MG tablet Take 0 5 mg by mouth 2 (two) times a day , Until Discontinued, Historical Med      LORazepam (ATIVAN) 1 mg tablet Take 1 mg by mouth Medrol Dose Pack scheduling ONLY , Until Discontinued, Historical Med      methimazole (TAPAZOLE) 5 mg tablet Take 5 mg by mouth 3 (three) times a day , Until Discontinued, Historical Med      norethindrone (AYGESTIN) 5 mg tablet Take 5 mg by mouth daily   Daily for ten days every other month, Until Discontinued, Historical Med      pregabalin (LYRICA) 50 mg capsule Take 50 mg by mouth 3 (three) times a day , Until Discontinued, Historical Med      propranolol (INDERAL) 10 mg tablet Take 10 mg by mouth 3 (three) times a day , Until Discontinued, Historical Med      quinapril (ACCUPRIL) 20 mg tablet Take 20 mg by mouth daily at bedtime  , Until Discontinued, Historical Med      TiZANidine (ZANAFLEX) 4 MG capsule Take 4 mg by mouth 3 (three) times a day , Until Discontinued, Historical Med      triamterene-hydrochlorothiazide (DYAZIDE) 37 5-25 mg per capsule Take 1 capsule by mouth every morning , Until Discontinued, Historical Med           No discharge procedures on file      ED Provider  Electronically Signed by           Ronny Bailey PA-C  11/22/17 0498 no

## 2021-09-07 NOTE — H&P CARDIOLOGY - NSICDXFAMILYHX_GEN_ALL_CORE_FT
FAMILY HISTORY:  Father  Still living? No  Family history of esophageal cancer, Age at diagnosis: Age Unknown  Family history of heart attack, Age at diagnosis: 51-60

## 2021-09-07 NOTE — H&P CARDIOLOGY - HISTORY OF PRESENT ILLNESS
60 year old male with bicuspid Aortic valve s/p bioprosthetic AVR 2017 who presented to his Cardiologist complaining of SOB with decreased exercise tolerance and increase in fatigue. Underwent a stress test with Ef 64%, mild severity, medium size reversible defect in basal and mid anterolateral wall consistent with ischemia.   In light of patients cardiac risk factors, symptoms and abnormal noninvasive test findings there is high suspicion for CAD. Patient is now referred to [facility] for a cardiac catheterization with possible PTCA/stent.     Denies fever, cough, chills, headache, flu like symptoms, sick contact or recent travel  COVID vaccinated     please see hard copy H&P in paper chart 8/30/21  PATIENT SEEN AND EXAMINED AND NO NEW CLINICAL CHANGES SINCE office visit

## 2021-09-07 NOTE — H&P CARDIOLOGY - NSICDXPASTMEDICALHX_GEN_ALL_CORE_FT
PAST MEDICAL HISTORY:  Aortic stenosis due to bicuspid aortic valve     Aortic stenosis, severe     HTN, age 0-18

## 2021-09-07 NOTE — PATIENT PROFILE ADULT - NSPROMEDSADMININFO_GEN_A_NUR
no concerns

## 2021-09-08 ENCOUNTER — TRANSCRIPTION ENCOUNTER (OUTPATIENT)
Age: 61
End: 2021-09-08

## 2021-09-08 VITALS
DIASTOLIC BLOOD PRESSURE: 77 MMHG | HEART RATE: 66 BPM | SYSTOLIC BLOOD PRESSURE: 115 MMHG | RESPIRATION RATE: 16 BRPM | OXYGEN SATURATION: 100 % | TEMPERATURE: 98 F

## 2021-09-08 RX ORDER — CLOPIDOGREL BISULFATE 75 MG/1
1 TABLET, FILM COATED ORAL
Qty: 90 | Refills: 0
Start: 2021-09-08 | End: 2021-12-06

## 2021-09-08 RX ORDER — CLOPIDOGREL BISULFATE 75 MG/1
1 TABLET, FILM COATED ORAL
Qty: 90 | Refills: 2
Start: 2021-09-08 | End: 2022-06-04

## 2021-09-08 RX ORDER — VALSARTAN 80 MG/1
1 TABLET ORAL
Qty: 0 | Refills: 0 | DISCHARGE

## 2021-09-08 RX ORDER — INFLUENZA VIRUS VACCINE 15; 15; 15; 15 UG/.5ML; UG/.5ML; UG/.5ML; UG/.5ML
0.5 SUSPENSION INTRAMUSCULAR ONCE
Refills: 0 | Status: DISCONTINUED | OUTPATIENT
Start: 2021-09-08 | End: 2021-09-08

## 2021-09-08 RX ADMIN — Medication 50 MILLIGRAM(S): at 06:26

## 2021-09-08 RX ADMIN — PANTOPRAZOLE SODIUM 40 MILLIGRAM(S): 20 TABLET, DELAYED RELEASE ORAL at 06:26

## 2021-09-08 RX ADMIN — CLOPIDOGREL BISULFATE 75 MILLIGRAM(S): 75 TABLET, FILM COATED ORAL at 11:52

## 2021-09-08 RX ADMIN — SODIUM CHLORIDE 3 MILLILITER(S): 9 INJECTION INTRAMUSCULAR; INTRAVENOUS; SUBCUTANEOUS at 06:25

## 2021-09-08 RX ADMIN — Medication 81 MILLIGRAM(S): at 11:52

## 2021-09-08 NOTE — DISCHARGE NOTE NURSING/CASE MANAGEMENT/SOCIAL WORK - NSDCVIVACCINE_GEN_ALL_CORE_FT
influenza, injectable, quadrivalent, preservative free; 11-Nov-2017 13:04; Carole Tran (RN); [employer]; [id number]; IntraMuscular; Deltoid Left.; 0.5 milliLiter(s); VIS (VIS Published: 07-Aug-2015, VIS Presented: 11-Nov-2017);

## 2021-09-08 NOTE — PROGRESS NOTE ADULT - ASSESSMENT
60 year old male with bicuspid Aortic valve s/p bioprosthetic AVR 2017 p/w abn stress testing w anginal symptoms s/p PCI w [employer] x1 RCA    CAD  Symptom free. Radial access site w/o e/o hematoma   Cont Aspirin, Plavix, Statin  Discharge home with Metoprolol succinate 100 mg QD   F/U with outpatient cardiologist 1-2 weeks   Discharge planning     Juan Carlos Caballero MD  Cardiology Fellow  [phone]    Please check [email or web address]: [email or web address]" for cardiology service schedule and contact information.

## 2021-09-08 NOTE — DISCHARGE NOTE PROVIDER - CARE PROVIDER_API CALL
Lakshmi Mirza)  Cardiology; Internal Medicine  [phone] [place] [address], [address]  [place], [place] [id number]  Phone: [phone]  Fax: [phone]  Follow Up Time:

## 2021-09-08 NOTE — PROGRESS NOTE ADULT - SUBJECTIVE AND OBJECTIVE BOX
Patient seen and examined at bedside.    Overnight Events: S/P PCI    Review Of Systems: No chest pain, shortness of breath, or palpitations            Current Meds:  aspirin enteric coated 81 milliGRAM(s) Oral daily  atorvastatin 80 milliGRAM(s) Oral at bedtime  clopidogrel Tablet 75 milliGRAM(s) Oral daily  influenza   Vaccine 0.5 milliLiter(s) IntraMuscular once  metoprolol tartrate 50 milliGRAM(s) Oral two times a day  pantoprazole    Tablet 40 milliGRAM(s) Oral before breakfast  sodium chloride 0.9% lock flush 3 milliLiter(s) IV Push every 8 hours      Vitals:  T(F): 98.4 (09-08), Max: 98.5 (09-07)  HR: 65 (09-08) (65 - 72)  BP: 126/82 (09-08) (126/82 - 137/68)  RR: 16 (09-08)  SpO2: 100% (09-08)  I&O's Summary      Physical Exam:  Appearance: No acute distress; well appearing  Eyes: EOMI, pink conjunctiva  HEENT: Normal oral mucosa  Cardiovascular: RRR, S1, S2, no murmurs, rubs, or gallops; no edema; *** access site  Respiratory: Clear to auscultation bilaterally  Gastrointestinal: soft, non-tender  Musculoskeletal: No clubbing; no joint deformity   Neurologic: Non-focal  Psychiatry: AAOx3, mood & affect appropriate  Skin: No rashes                          13.4   6.62  )-----------( 222      ( 07 Sep 2021 13:54 )             39.6     09-07    140  |  104  |  13  ----------------------------<  101<H>  4.9   |  27  |  0.90    Ca    9.3      07 Sep 2021 13:54                    Echo:    Cath:    Imaging:   Patient seen and examined at bedside.    Overnight Events: S/P PCI RCA    Review Of Systems: No chest pain, shortness of breath, or palpitations            Current Meds:  aspirin enteric coated 81 milliGRAM(s) Oral daily  atorvastatin 80 milliGRAM(s) Oral at bedtime  clopidogrel Tablet 75 milliGRAM(s) Oral daily  influenza   Vaccine 0.5 milliLiter(s) IntraMuscular once  metoprolol tartrate 50 milliGRAM(s) Oral two times a day  pantoprazole    Tablet 40 milliGRAM(s) Oral before breakfast  sodium chloride 0.9% lock flush 3 milliLiter(s) IV Push every 8 hours      Vitals:  T(F): 98.4 (09-08), Max: 98.5 (09-07)  HR: 65 (09-08) (65 - 72)  BP: 126/82 (09-08) (126/82 - 137/68)  RR: 16 (09-08)  SpO2: 100% (09-08)  I&O's Summary      Physical Exam:  Appearance: No acute distress; well appearing  Eyes: EOMI, pink conjunctiva  HEENT: Normal oral mucosa  Cardiovascular: RRR, S1, S2, no murmurs, rubs, or gallops; no edema; Right radial access site soft w/o e/o hematoma   Respiratory: Clear to auscultation bilaterally  Gastrointestinal: soft, non-tender  Musculoskeletal: No clubbing; no joint deformity   Neurologic: Non-focal  Psychiatry: AAOx3, mood & affect appropriate  Skin: No rashes                          13.4   6.62  )-----------( 222      ( 07 Sep 2021 13:54 )             39.6     09-07    140  |  104  |  13  ----------------------------<  101<H>  4.9   |  27  |  0.90    Ca    9.3      07 Sep 2021 13:54

## 2021-09-08 NOTE — DISCHARGE NOTE PROVIDER - HOSPITAL COURSE
60 year old male with bicuspid Aortic valve s/p bioprosthetic AVR 2017 who presented to his Cardiologist complaining of SOB with decreased exercise tolerance and increase in fatigue. +stress test .  9/7-s/p LHC: synergy stent dRCA 80; through  Right radial access  Continue with ASA/Plavix  Discussed with medical attending ____________________________ and patient  is ready for discharge.    60 year old male with bicuspid Aortic valve s/p bioprosthetic AVR 2017 who presented to his Cardiologist complaining of SOB with decreased exercise tolerance and increase in fatigue. +stress test .  9/7-s/p LHC: synergy stent dRCA 80; through  Right radial access  Continue with ASA/Plavix  Discussed with medical attending Dr. Lakshmi Mirza and patient  is ready for discharge.

## 2021-09-08 NOTE — DISCHARGE NOTE PROVIDER - NSDCMRMEDTOKEN_GEN_ALL_CORE_FT
aspirin 81 mg oral tablet: 1 tab(s) orally once a day  atorvastatin 80 mg oral tablet: 1 tab(s) orally once a day  Diovan 40 mg oral tablet: 1 tab(s) orally once a day (pt did not start yet, new RX)  metoprolol tartrate 50 mg oral tablet: 1 tab(s) orally 2 times a day  Protonix 40 mg oral delayed release tablet: 1 tab(s) orally once a day   aspirin 81 mg oral tablet: 1 tab(s) orally once a day  atorvastatin 80 mg oral tablet: 1 tab(s) orally once a day  clopidogrel 75 mg oral tablet: 1 tab(s) orally once a day  metoprolol tartrate 50 mg oral tablet: 1 tab(s) orally 2 times a day  Protonix 40 mg oral delayed release tablet: 1 tab(s) orally once a day

## 2021-09-08 NOTE — DISCHARGE NOTE NURSING/CASE MANAGEMENT/SOCIAL WORK - PATIENT PORTAL LINK FT
You can access the FollowMyHealth Patient Portal offered by [facility] by registering at the following website: [email or web address]. By joining [employer]’s FollowMyHealth portal, you will also be able to view your health information using other applications (apps) compatible with our system.

## 2021-09-08 NOTE — DISCHARGE NOTE PROVIDER - NSDCCPCAREPLAN_GEN_ALL_CORE_FT
PRINCIPAL DISCHARGE DIAGNOSIS  Diagnosis: CAD (coronary artery disease)  Assessment and Plan of Treatment: You came in with shortness of breath and had an abnormal stress test. So you had an angiogram done on 9/7  through the right wrist. And a stent was placed in one of the main vessel right coronary artery.   Continue with aspirin and plavix and other medications as prescribed.  Call your doctor if you have any new pain, pressure, or discomfort in the center of your chest, pain, tingling or discomfort in arms, back, neck, jaw, or stomach, shortness of breath, nausea, vomiting, burping or heartburn, sweating, cold and clammy skin, racing or abnormal heartbeat for more than 10 minutes or if they keep coming & going.    You can help yourself with lefestyle changes (quitting smoking if you smoke), eat lots of fruits & vegetables & low fat dairy products, not a lot of meat & fatty foods, walk or some form of physical activity most days of the week, lose weight if you are overweight  Take your cardiac medication as prescribed to lower cholesterol, to lower blood pressure, aspirin to prevent blood clots, and diabetes control  Make sure to keep appointments with doctor for cardiac follow up care and with primary care physician in a 1-2 weeks

## 2021-09-10 PROBLEM — I10 ESSENTIAL (PRIMARY) HYPERTENSION: Chronic | Status: ACTIVE | Noted: 2021-09-07

## 2021-09-14 DIAGNOSIS — R06.2 WHEEZING: ICD-10-CM

## 2021-09-14 RX ORDER — ASPIRIN 81 MG
81 TABLET, DELAYED RELEASE (ENTERIC COATED) ORAL DAILY
Qty: 30 | Refills: 5 | Status: DISCONTINUED | COMMUNITY
End: 2021-09-14

## 2021-09-14 RX ORDER — PANTOPRAZOLE SODIUM 40 MG/1
40 TABLET, DELAYED RELEASE ORAL DAILY
Refills: 0 | Status: ACTIVE | COMMUNITY

## 2021-09-14 RX ORDER — SIMVASTATIN 40 MG/1
40 TABLET, FILM COATED ORAL
Qty: 30 | Refills: 0 | Status: DISCONTINUED | COMMUNITY
Start: 2018-04-26 | End: 2021-09-14

## 2021-09-22 ENCOUNTER — APPOINTMENT (OUTPATIENT)
Dept: CARDIOLOGY | Facility: CLINIC | Age: 61
End: 2021-09-22

## 2021-10-06 ENCOUNTER — APPOINTMENT (OUTPATIENT)
Dept: CARDIOLOGY | Facility: CLINIC | Age: 61
End: 2021-10-06
Payer: MEDICAID

## 2021-10-06 ENCOUNTER — NON-APPOINTMENT (OUTPATIENT)
Age: 61
End: 2021-10-06

## 2021-10-06 VITALS
BODY MASS INDEX: 25.73 KG/M2 | WEIGHT: 190 LBS | HEART RATE: 55 BPM | TEMPERATURE: 98 F | OXYGEN SATURATION: 98 % | RESPIRATION RATE: 16 BRPM | HEIGHT: 72 IN | SYSTOLIC BLOOD PRESSURE: 118 MMHG | DIASTOLIC BLOOD PRESSURE: 77 MMHG

## 2021-10-06 PROCEDURE — 99214 OFFICE O/P EST MOD 30 MIN: CPT

## 2021-10-06 PROCEDURE — 93000 ELECTROCARDIOGRAM COMPLETE: CPT

## 2021-10-06 PROCEDURE — 99204 OFFICE O/P NEW MOD 45 MIN: CPT

## 2021-10-06 RX ORDER — METOPROLOL TARTRATE 50 MG/1
50 TABLET, FILM COATED ORAL
Qty: 180 | Refills: 3 | Status: ACTIVE | COMMUNITY
Start: 1900-01-01 | End: 1900-01-01

## 2021-10-06 RX ORDER — ASPIRIN 81 MG/1
81 TABLET ORAL
Qty: 90 | Refills: 3 | Status: ACTIVE | COMMUNITY
Start: 1900-01-01 | End: 1900-01-01

## 2021-10-06 RX ORDER — CLOPIDOGREL BISULFATE 75 MG/1
75 TABLET, FILM COATED ORAL
Qty: 90 | Refills: 3 | Status: ACTIVE | COMMUNITY
Start: 1900-01-01 | End: 1900-01-01

## 2021-10-06 RX ORDER — ATORVASTATIN CALCIUM 80 MG/1
80 TABLET, FILM COATED ORAL
Qty: 90 | Refills: 3 | Status: ACTIVE | COMMUNITY
Start: 1900-01-01 | End: 1900-01-01

## 2021-10-12 NOTE — DISCUSSION/SUMMARY
[FreeTextEntry1] : CAD status post PCI, no angina at this time, continue with dual antiplatelet therapy for 6 months and aggressive risk factor modification.  Patient has outside cardiologist that he sees I recommend that he should continue seeking care with her and he will contact us as needed basis.

## 2021-10-12 NOTE — HISTORY OF PRESENT ILLNESS
[FreeTextEntry1] : 60-year-old male, CAD, aortic valve surgery.  He had chronic stable angina, underwent PCI with drug-eluting stent to right coronary artery.  Clinically doing well and tolerating aspirin and clopidogrel without any adverse effects.

## 2022-12-16 ENCOUNTER — OUTPATIENT (OUTPATIENT)
Dept: OUTPATIENT SERVICES | Facility: HOSPITAL | Age: 62
LOS: 1 days | End: 2022-12-16
Payer: MEDICAID

## 2022-12-16 DIAGNOSIS — Z87.442 PERSONAL HISTORY OF URINARY CALCULI: Chronic | ICD-10-CM

## 2022-12-16 DIAGNOSIS — Z95.2 PRESENCE OF PROSTHETIC HEART VALVE: Chronic | ICD-10-CM

## 2022-12-16 DIAGNOSIS — I25.10 ATHEROSCLEROTIC HEART DISEASE OF NATIVE CORONARY ARTERY WITHOUT ANGINA PECTORIS: ICD-10-CM

## 2022-12-16 PROCEDURE — 78452 HT MUSCLE IMAGE SPECT MULT: CPT | Mod: 26,MH

## 2022-12-16 PROCEDURE — 93016 CV STRESS TEST SUPVJ ONLY: CPT

## 2022-12-16 PROCEDURE — 78452 HT MUSCLE IMAGE SPECT MULT: CPT | Mod: MH

## 2022-12-16 PROCEDURE — 93018 CV STRESS TEST I&R ONLY: CPT

## 2022-12-16 PROCEDURE — A9502: CPT

## 2022-12-16 PROCEDURE — 93017 CV STRESS TEST TRACING ONLY: CPT

## 2023-09-27 ENCOUNTER — APPOINTMENT (OUTPATIENT)
Dept: CARDIOLOGY | Facility: CLINIC | Age: 63
End: 2023-09-27

## 2023-09-27 NOTE — PATIENT PROFILE ADULT. - AS SC BRADEN NUTRITION
"Pool Wilsonankit Luo was seen and treated in our emergency department on 9/27/2023.  He may return to work on 09/29/2023.       If you have any questions or concerns, please don't hesitate to call.      Tony Fuentes, EYAD" (3) adequate

## 2024-02-28 ENCOUNTER — APPOINTMENT (OUTPATIENT)
Dept: CARDIOLOGY | Facility: CLINIC | Age: 64
End: 2024-02-28

## 2024-07-03 ENCOUNTER — APPOINTMENT (OUTPATIENT)
Dept: CARDIOLOGY | Facility: CLINIC | Age: 64
End: 2024-07-03
Payer: MEDICAID

## 2024-07-03 ENCOUNTER — NON-APPOINTMENT (OUTPATIENT)
Age: 64
End: 2024-07-03

## 2024-07-03 VITALS
DIASTOLIC BLOOD PRESSURE: 75 MMHG | RESPIRATION RATE: 16 BRPM | BODY MASS INDEX: 26.85 KG/M2 | OXYGEN SATURATION: 96 % | HEART RATE: 55 BPM | TEMPERATURE: 98.4 F | WEIGHT: 198 LBS | SYSTOLIC BLOOD PRESSURE: 126 MMHG

## 2024-07-03 DIAGNOSIS — I25.10 ATHEROSCLEROTIC HEART DISEASE OF NATIVE CORONARY ARTERY W/OUT ANGINA PECTORIS: ICD-10-CM

## 2024-07-03 DIAGNOSIS — E78.5 HYPERLIPIDEMIA, UNSPECIFIED: ICD-10-CM

## 2024-07-03 PROCEDURE — 99214 OFFICE O/P EST MOD 30 MIN: CPT | Mod: 25

## 2024-07-03 PROCEDURE — 93000 ELECTROCARDIOGRAM COMPLETE: CPT

## 2024-07-14 PROBLEM — I25.10 CAD (CORONARY ARTERY DISEASE): Status: ACTIVE | Noted: 2024-07-14

## 2024-07-14 PROBLEM — E78.5 DYSLIPIDEMIA: Status: ACTIVE | Noted: 2024-07-14

## 2024-11-06 ENCOUNTER — APPOINTMENT (OUTPATIENT)
Dept: CARDIOLOGY | Facility: CLINIC | Age: 64
End: 2024-11-06

## 2025-01-09 NOTE — PROGRESS NOTE ADULT - SUBJECTIVE AND OBJECTIVE BOX
Colace - 2 tabs daily (stool softener) + metamucil daily.   If no bm in 2-3 days, try miralax 1 capful daily.   Try lidocaine gel - gold bond.      Fatigue: Care Instructions  Overview     Fatigue is a feeling of tiredness, exhaustion, or lack of energy. You may feel fatigue because of too much or not enough activity. It can also come from stress, lack of sleep, boredom, and poor diet. Many medical problems, such as viral infections, can cause fatigue. Emotional problems, especially depression, are often the cause of fatigue.  Fatigue is most often a symptom of another problem. Treatment for fatigue depends on the cause. For example, if you have fatigue because you have a certain health problem, treating this problem also treats your fatigue. If depression or anxiety is the cause, treatment may help.  Follow-up care is a key part of your treatment and safety. Be sure to make and go to all appointments, and call your doctor if you are having problems. It's also a good idea to know your test results and keep a list of the medicines you take.  How can you care for yourself at home?  Get regular exercise. But try not to overdo it. It may help to go back and forth between rest and exercise.  Get plenty of rest.  Eat a variety of healthy foods. Try not to skip any meals.  Avoid or try to cut back on your use of caffeine, tobacco, and alcohol. Caffeine is most often found in coffee, tea, cola drinks, and energy drinks.  Limit medicines that can cause fatigue. These include medicines such as cold and allergy medicines.  When should you call for help?  Watch closely for changes in your health, and be sure to contact your doctor if:    You have new symptoms such as fever or a rash.     Your fatigue gets worse.     You have been feeling down, depressed, or hopeless. Or you may have lost interest in things that you usually enjoy.     You are not getting better as expected.   Where can you learn more?  Go to  CT Removal:    Pt seen and examined at bedside.  Case discussed with Dr. Barahona.  Minimal output from bilateral CTs.  No air leak appreciated.  Bilateral CT removed without incident per Dr. Barahona request.  Occlusive DSD placed.  CXR no obvious PTX noted.  Pt tolerated procedure well.

## 2025-04-30 ENCOUNTER — APPOINTMENT (OUTPATIENT)
Dept: CARDIOLOGY | Facility: CLINIC | Age: 65
End: 2025-04-30

## 2025-08-06 ENCOUNTER — APPOINTMENT (OUTPATIENT)
Dept: CARDIOLOGY | Facility: CLINIC | Age: 65
End: 2025-08-06

## 2025-09-02 ENCOUNTER — APPOINTMENT (OUTPATIENT)
Dept: CARDIOLOGY | Facility: CLINIC | Age: 65
End: 2025-09-02